# Patient Record
Sex: MALE | Race: WHITE | Employment: UNEMPLOYED | URBAN - METROPOLITAN AREA
[De-identification: names, ages, dates, MRNs, and addresses within clinical notes are randomized per-mention and may not be internally consistent; named-entity substitution may affect disease eponyms.]

---

## 2018-06-06 ENCOUNTER — OFFICE VISIT (OUTPATIENT)
Dept: FAMILY MEDICINE CLINIC | Age: 33
End: 2018-06-06

## 2018-06-06 VITALS
WEIGHT: 214 LBS | HEIGHT: 70 IN | BODY MASS INDEX: 30.64 KG/M2 | RESPIRATION RATE: 15 BRPM | TEMPERATURE: 98.6 F | OXYGEN SATURATION: 98 % | HEART RATE: 79 BPM | DIASTOLIC BLOOD PRESSURE: 70 MMHG | SYSTOLIC BLOOD PRESSURE: 126 MMHG

## 2018-06-06 DIAGNOSIS — H65.02 ACUTE SEROUS OTITIS MEDIA OF LEFT EAR, RECURRENCE NOT SPECIFIED: ICD-10-CM

## 2018-06-06 DIAGNOSIS — F41.9 ANXIETY: ICD-10-CM

## 2018-06-06 DIAGNOSIS — K04.7 ABSCESSED TOOTH: Primary | ICD-10-CM

## 2018-06-06 PROBLEM — F32.A DEPRESSION: Status: ACTIVE | Noted: 2018-06-06

## 2018-06-06 RX ORDER — CLINDAMYCIN HYDROCHLORIDE 300 MG/1
CAPSULE ORAL
COMMUNITY
Start: 2018-06-04 | End: 2018-06-25 | Stop reason: SDUPTHER

## 2018-06-06 RX ORDER — CITALOPRAM 10 MG/1
TABLET ORAL
Refills: 5 | COMMUNITY
Start: 2018-05-16 | End: 2018-09-20 | Stop reason: ALTCHOICE

## 2018-06-06 RX ORDER — NEOMYCIN SULFATE, POLYMYXIN B SULFATE AND HYDROCORTISONE 10; 3.5; 1 MG/ML; MG/ML; [USP'U]/ML
3 SUSPENSION/ DROPS AURICULAR (OTIC) 4 TIMES DAILY
Qty: 10 ML | Refills: 0 | Status: SHIPPED | OUTPATIENT
Start: 2018-06-06 | End: 2018-06-16

## 2018-06-06 NOTE — PATIENT INSTRUCTIONS
Abscessed Tooth: Care Instructions  Your Care Instructions    An abscessed tooth is a tooth that has a pocket of pus in the tissues around it. Pus forms when the body tries to fight an infection caused by bacteria. If the pus cannot drain, it forms an abscess. An abscessed tooth can cause red, swollen gums and throbbing pain, especially when you chew. You may have a bad taste in your mouth and a fever, and your jaw may swell. Damage to the tooth, untreated tooth decay, or gum disease can cause an abscessed tooth. An abscessed tooth needs to be treated by a dental professional right away. If it is not treated, the infection could spread to other parts of your body. Your dentist will give you antibiotics to stop the infection. He or she may make a hole in the tooth or cut open (yina) the abscess inside your mouth so that the infection can drain, which should relieve your pain. You may need to have a root canal treatment, which tries to save your tooth by taking out the infected pulp and replacing it with a healing medicine and/or a filling. If these treatments do not work, your tooth may have to be removed. Follow-up care is a key part of your treatment and safety. Be sure to make and go to all appointments, and call your doctor if you are having problems. It's also a good idea to know your test results and keep a list of the medicines you take. How can you care for yourself at home? · Reduce pain and swelling in your face and jaw by putting ice or a cold pack on the outside of your cheek for 10 to 20 minutes at a time. Put a thin cloth between the ice and your skin. · Take pain medicines exactly as directed. ¨ If the doctor gave you a prescription medicine for pain, take it as prescribed. ¨ If you are not taking a prescription pain medicine, ask your doctor if you can take an over-the-counter medicine. · Take your antibiotics as directed. Do not stop taking them just because you feel better.  You need to take the full course of antibiotics. To prevent tooth abscess  · Brush and floss every day, and have regular dental checkups. · Eat a healthy diet, and avoid sugary foods and drinks. · Do not smoke, use e-cigarettes with nicotine, or use spit tobacco. Tobacco and nicotine slow your ability to heal. Tobacco also increases your risk for gum disease and cancer of the mouth and throat. If you need help quitting, talk to your doctor about stop-smoking programs and medicines. These can increase your chances of quitting for good. When should you call for help? Call 911 anytime you think you may need emergency care. For example, call if:  ? · You have trouble breathing. ?Call your doctor now or seek immediate medical care if:  ? · You have new or worse symptoms of infection, such as:  ¨ Increased pain, swelling, warmth, or redness. ¨ Red streaks leading from the area. ¨ Pus draining from the area. ¨ A fever. ? Watch closely for changes in your health, and be sure to contact your doctor if:  ? · You do not get better as expected. Where can you learn more? Go to http://june-adonis.info/. Enter T604 in the search box to learn more about \"Abscessed Tooth: Care Instructions. \"  Current as of: May 12, 2017  Content Version: 11.4  © 8575-9461 Healthwise, Incorporated. Care instructions adapted under license by [employer] (which disclaims liability or warranty for this information). If you have questions about a medical condition or this instruction, always ask your healthcare professional. [place] [address] any warranty or liability for your use of this information.

## 2018-06-06 NOTE — MR AVS SNAPSHOT
[facility] [address] [facility] [facility] [address] 
[phone] Patient: Shante Bautista MRN: [id number] Visit Information Date & Time Provider Department Dept. Phone Encounter #  
 6/6/2018 11:30 AM Sun Jade [phone] [id number] Follow-up Instructions Return in about 2 months (around 8/6/2018). Your Appointments 6/6/2018 11:30 AM  
New Patient with MD Sun Jade [facility] App[facility] Note: np est care, seizures, bringing id & ins, arriving 30 min early [facility]; 6/4/18 - mom [facility] appt, also tooth ache. md  
 [address] [facility] [address] [place] [address] Road  
[phone]  
  
   
 [facility] [address] [place] [address] Upcoming Health Maintenance Date Due DTaP/Tdap/Td series (1 - Tdap) 9/3/2007 Influenza Age 5 to Adult 8/1/2018 Allergies as of 6/6/2018  Review Complete On: 6/11/2014 By: Francisco Joseph MD  
  
 Severity Noted Reaction Type Reactions Shellfish Derived High 06/06/2018    Anaphylaxis Sulfa (Sulfonamide Antibiotics) High 06/11/2014   Systemic Hives, Rash Ceclor [Cefaclor] Medium 06/11/2014   Systemic Unknown (comments) Erythromycin Medium 06/11/2014   Systemic Unknown (comments) Other Plant, Animal, Environmental Medium 06/11/2014   Systemic Rash, Itching Seasonal allergies to 10 types of trees Current Immunizations  Never Reviewed No immunizations on file. Not reviewed this visit You Were Diagnosed With   
  
 Codes Comments Abscessed tooth    -  Primary ICD-10-CM: K04.7 ICD-9-CM: 522.5 Anxiety     ICD-10-CM: F41.9 ICD-9-CM: 300.00 Vitals BP Pulse Temp Resp Height(growth percentile) Weight(growth percentile) 126/70 79 98.6 °F (37 °C) 15 5' 10\" (1.778 m) 214 lb (97.1 kg) SpO2 BMI Smoking Status 98% 30.71 kg/m2 Never Smoker Vitals History BMI and BSA Data Body Mass Index Body Surface Area 30.71 kg/m 2 2.19 m 2 Your Updated Medication List  
  
   
This list is accurate as of 6/6/18 11:02 AM.  Always use your most recent med list.  
  
  
  
  
 citalopram 10 mg tablet Commonly known as:  CELEXA  
TAKE 1 TABLET BY MOUTH EVERY DAY  
  
 clindamycin 300 mg capsule Commonly known as:  CLEOCIN Follow-up Instructions Return in about 2 months (around 8/6/2018). Patient Instructions Abscessed Tooth: Care Instructions Your Care Instructions An abscessed tooth is a tooth that has a pocket of pus in the tissues around it. Pus forms when the body tries to fight an infection caused by bacteria. If the pus cannot drain, it forms an abscess. An abscessed tooth can cause red, swollen gums and throbbing pain, especially when you chew. You may have a bad taste in your mouth and a fever, and your jaw may swell. Damage to the tooth, untreated tooth decay, or gum disease can cause an abscessed tooth. An abscessed tooth needs to be treated by a dental professional right away. If it is not treated, the infection could spread to other parts of your body. Your dentist will give you antibiotics to stop the infection. He or she may make a hole in the tooth or cut open (yina) the abscess inside your mouth so that the infection can drain, which should relieve your pain. You may need to have a root canal treatment, which tries to save your tooth by taking out the infected pulp and replacing it with a healing medicine and/or a filling. If these treatments do not work, your tooth may have to be removed. Follow-up care is a key part of your treatment and safety. Be sure to make and go to all appointments, and call your doctor if you are having problems. It's also a good idea to know your test results and keep a list of the medicines you take. How can you care for yourself at home? · Reduce pain and swelling in your face and jaw by putting ice or a cold pack on the outside of your cheek for 10 to 20 minutes at a time. Put a thin cloth between the ice and your skin. · Take pain medicines exactly as directed. ¨ If the doctor gave you a prescription medicine for pain, take it as prescribed. ¨ If you are not taking a prescription pain medicine, ask your doctor if you can take an over-the-counter medicine. · Take your antibiotics as directed. Do not stop taking them just because you feel better. You need to take the full course of antibiotics. To prevent tooth abscess · Brush and floss every day, and have regular dental checkups. · Eat a healthy diet, and avoid sugary foods and drinks. · Do not smoke, use e-cigarettes with nicotine, or use spit tobacco. Tobacco and nicotine slow your ability to heal. Tobacco also increases your risk for gum disease and cancer of the mouth and throat. If you need help quitting, talk to your doctor about stop-smoking programs and medicines. These can increase your chances of quitting for good. When should you call for help? Call 911 anytime you think you may need emergency care. For example, call if: 
? · You have trouble breathing. ?Call your doctor now or seek immediate medical care if: 
? · You have new or worse symptoms of infection, such as: 
¨ Increased pain, swelling, warmth, or redness. ¨ Red streaks leading from the area. ¨ Pus draining from the area. ¨ A fever. ? Watch closely for changes in your health, and be sure to contact your doctor if: 
? · You do not get better as expected. Where can you learn more? Go to http://june-adonis.info/. Enter N329 in the search box to learn more about \"Abscessed Tooth: Care Instructions. \" Current as of: May 12, 2017 Content Version: 11.4 © 7821-1853 Healthwise, Incorporated.  Care instructions adapted under license by [address] (which disclaims liability or warranty for this information). If you have questions about a medical condition or this instruction, always ask your healthcare professional. [address] any warranty or liability for your use of this information. Introducing [facility]! Antwan Martin introduces GetGoing patient portal. Now you can access parts of your medical record, email your doctor's office, and request medication refills online. 1. In your internet browser, go to [email or web address] 2. Click on the First Time User? Click Here link in the Sign In box. You will see the New Member Sign Up page. 3. Enter your GetGoing Access Code exactly as it appears below. You will not need to use this code after youve completed the sign-up process. If you do not sign up before the expiration date, you must request a new code. · GetGoing Access Code: [email or web address] Expires: 9/4/2018 11:01 AM 
 
4. Enter the last four digits of your Social Security Number (xxxx) and Date of Birth (mm/dd/yyyy) as indicated and click Submit. You will be taken to the next sign-up page. 5. Create a GetGoing ID. This will be your GetGoing login ID and cannot be changed, so think of one that is secure and easy to remember. 6. Create a GetGoing password. You can change your password at any time. 7. Enter your Password Reset Question and Answer. This can be used at a later time if you forget your password. 8. Enter your e-mail address. You will receive e-mail notification when new information is available in [address] 9. Click Sign Up. You can now view and download portions of your medical record. 10. Click the Download Summary menu link to download a portable copy of your medical information. If you have questions, please visit the Frequently Asked Questions section of the GetGoing website.  Remember, GetGoing is NOT to be used for urgent needs. For medical emergencies, dial 911. Now available from your iPhone and Android! Please provide this summary of care documentation to your next provider. If you have any questions after today's visit, please call [phone].

## 2018-06-06 NOTE — PROGRESS NOTES
Abimael Saxena is a 33 y.o. male  presents for establish care. He has history of seizures. He also has abscessed tooth. He has pain in left ear. Allergies   Allergen Reactions    Shellfish Derived Anaphylaxis    Sulfa (Sulfonamide Antibiotics) Hives and Rash    Ceclor [Cefaclor] Unknown (comments)    Erythromycin Unknown (comments)    Other Plant, Animal, Environmental Rash and Itching     Seasonal allergies to 10 types of trees       Patient Active Problem List   Diagnosis Code    Pollen allergies J30.1    Scoliosis M41.9    Depression F32.9     Past Medical History:   Diagnosis Date    Chronic ear infection     Knee pain     right knee pain     Scoliosis     curvature of spine     Social History     Social History    Marital status: LEGALLY      Spouse name: N/A    Number of children: N/A    Years of education: N/A     Social History Main Topics    Smoking status: Never Smoker    Smokeless tobacco: Never Used    Alcohol use 0.0 - 0.5 oz/week     0 - 1 drink(s) per week    Drug use: No    Sexual activity: Not Asked     Other Topics Concern    None     Social History Narrative     Family History   Problem Relation Age of Onset    Hypertension Mother     Seizures Mother     Seizures Brother     Hypertension Maternal Grandmother     Cancer Maternal Grandfather         Review of Systems   Constitutional: Negative. Negative for chills, diaphoresis and fever. HENT: Positive for ear pain. Negative for ear discharge, hearing loss and tinnitus. Eyes: Negative. Respiratory: Negative. Negative for cough, sputum production and shortness of breath. Cardiovascular: Negative for chest pain, palpitations and leg swelling. Gastrointestinal: Negative for constipation, diarrhea, heartburn and nausea. Musculoskeletal: Negative. Skin: Negative for rash. Neurological: Negative.   Negative for dizziness, tingling, sensory change, speech change, focal weakness, seizures, weakness and headaches. Psychiatric/Behavioral: Negative for hallucinations, memory loss, substance abuse and suicidal ideas. The patient is nervous/anxious. The patient does not have insomnia. Vitals:    06/06/18 1047   BP: 126/70   Pulse: 79   Resp: 15   Temp: 98.6 °F (37 °C)   SpO2: 98%   Weight: 214 lb (97.1 kg)   Height: 5' 10\" (1.778 m)   PainSc:   0 - No pain       Physical Exam   Constitutional: He is oriented to person, place, and time and well-developed, well-nourished, and in no distress. HENT:   Injected tm on left. Eyes: Conjunctivae are normal. Pupils are equal, round, and reactive to light. Neck: Normal range of motion. Neck supple. Cardiovascular: Normal rate, regular rhythm and normal heart sounds. Pulmonary/Chest: Effort normal and breath sounds normal.   Neurological: He is alert and oriented to person, place, and time. Skin: Skin is warm and dry. Psychiatric: Mood, memory, affect and judgment normal.   Nursing note and vitals reviewed. Assessment/Plan      ICD-10-CM ICD-9-CM    1. Abscessed tooth K04.7 522.5    2. Anxiety F41.9 300.00    3. Acute serous otitis media of left ear, recurrence not specified H65.02 381.01 neomycin-polymyxin-hydrocortisone, buffered, (PEDIOTIC) 3.5-10,000-1 mg/mL-unit/mL-% otic suspension     I have discussed the diagnosis with the patient and the intended plan of care as seen in the above orders. The patient has received an after-visit summary and questions were answered concerning future plans. I have discussed medication, side effects, and warnings with the patient in detail. The patient verbalized understanding and is in agreement with the plan of care. The patient will contact the office with any additional concerns. Follow-up Disposition:  Return in about 1 week (around 6/13/2018).   lab results and schedule of future lab studies reviewed with patient    Adriane Vivas MD

## 2018-06-12 ENCOUNTER — OFFICE VISIT (OUTPATIENT)
Dept: FAMILY MEDICINE CLINIC | Age: 33
End: 2018-06-12

## 2018-06-12 ENCOUNTER — HOSPITAL ENCOUNTER (OUTPATIENT)
Dept: LAB | Age: 33
Discharge: HOME | End: 2018-06-12
Payer: COMMERCIAL

## 2018-06-12 VITALS
SYSTOLIC BLOOD PRESSURE: 108 MMHG | RESPIRATION RATE: 14 BRPM | DIASTOLIC BLOOD PRESSURE: 70 MMHG | WEIGHT: 216.4 LBS | TEMPERATURE: 98.2 F | HEIGHT: 70 IN | OXYGEN SATURATION: 97 % | BODY MASS INDEX: 30.98 KG/M2 | HEART RATE: 65 BPM

## 2018-06-12 DIAGNOSIS — F32.89 OTHER DEPRESSION: Primary | ICD-10-CM

## 2018-06-12 DIAGNOSIS — F32.89 OTHER DEPRESSION: ICD-10-CM

## 2018-06-12 LAB
ALBUMIN SERPL-MCNC: 4.1 G/DL (ref 3.4–5)
ALBUMIN/GLOB SERPL: 1.3 {RATIO} (ref 0.8–1.7)
ALP SERPL-CCNC: 105 U/L (ref 45–117)
ALT SERPL-CCNC: 40 U/L (ref 16–61)
ANION GAP SERPL CALC-SCNC: 9 MMOL/L (ref 3–18)
AST SERPL-CCNC: 24 U/L (ref 15–37)
BASOPHILS # BLD: 0.1 K/UL (ref 0–0.06)
BASOPHILS NFR BLD: 1 % (ref 0–2)
BILIRUB SERPL-MCNC: 0.3 MG/DL (ref 0.2–1)
BUN SERPL-MCNC: 14 MG/DL (ref 7–18)
BUN/CREAT SERPL: 14 (ref 12–20)
CALCIUM SERPL-MCNC: 9.2 MG/DL (ref 8.5–10.1)
CHLORIDE SERPL-SCNC: 101 MMOL/L (ref 100–108)
CHOLEST SERPL-MCNC: 218 MG/DL
CO2 SERPL-SCNC: 29 MMOL/L (ref 21–32)
CREAT SERPL-MCNC: 1.01 MG/DL (ref 0.6–1.3)
DIFFERENTIAL METHOD BLD: ABNORMAL
EOSINOPHIL # BLD: 0.2 K/UL (ref 0–0.4)
EOSINOPHIL NFR BLD: 2 % (ref 0–5)
ERYTHROCYTE [DISTWIDTH] IN BLOOD BY AUTOMATED COUNT: 12.6 % (ref 11.6–14.5)
GLOBULIN SER CALC-MCNC: 3.2 G/DL (ref 2–4)
GLUCOSE SERPL-MCNC: 85 MG/DL (ref 74–99)
HCT VFR BLD AUTO: 47.9 % (ref 36–48)
HDLC SERPL-MCNC: 41 MG/DL (ref 40–60)
HDLC SERPL: 5.3 {RATIO} (ref 0–5)
HGB BLD-MCNC: 16.2 G/DL (ref 13–16)
LDLC SERPL CALC-MCNC: 149.2 MG/DL (ref 0–100)
LIPID PROFILE,FLP: ABNORMAL
LYMPHOCYTES # BLD: 2 K/UL (ref 0.9–3.6)
LYMPHOCYTES NFR BLD: 28 % (ref 21–52)
MCH RBC QN AUTO: 31.8 PG (ref 24–34)
MCHC RBC AUTO-ENTMCNC: 33.8 G/DL (ref 31–37)
MCV RBC AUTO: 94.1 FL (ref 74–97)
MONOCYTES # BLD: 0.7 K/UL (ref 0.05–1.2)
MONOCYTES NFR BLD: 10 % (ref 3–10)
NEUTS SEG # BLD: 4.1 K/UL (ref 1.8–8)
NEUTS SEG NFR BLD: 59 % (ref 40–73)
PLATELET # BLD AUTO: 248 K/UL (ref 135–420)
PMV BLD AUTO: 10.7 FL (ref 9.2–11.8)
POTASSIUM SERPL-SCNC: 4.7 MMOL/L (ref 3.5–5.5)
PROT SERPL-MCNC: 7.3 G/DL (ref 6.4–8.2)
RBC # BLD AUTO: 5.09 M/UL (ref 4.7–5.5)
SODIUM SERPL-SCNC: 139 MMOL/L (ref 136–145)
TRIGL SERPL-MCNC: 139 MG/DL (ref ?–150)
TSH SERPL DL<=0.05 MIU/L-ACNC: 3.78 UIU/ML (ref 0.36–3.74)
VLDLC SERPL CALC-MCNC: 27.8 MG/DL
WBC # BLD AUTO: 6.9 K/UL (ref 4.6–13.2)

## 2018-06-12 PROCEDURE — 84443 ASSAY THYROID STIM HORMONE: CPT | Performed by: FAMILY MEDICINE

## 2018-06-12 PROCEDURE — 80053 COMPREHEN METABOLIC PANEL: CPT | Performed by: FAMILY MEDICINE

## 2018-06-12 PROCEDURE — 80061 LIPID PANEL: CPT | Performed by: FAMILY MEDICINE

## 2018-06-12 PROCEDURE — 85025 COMPLETE CBC W/AUTO DIFF WBC: CPT | Performed by: FAMILY MEDICINE

## 2018-06-12 NOTE — PATIENT INSTRUCTIONS
Recovering From Depression: Care Instructions  Your Care Instructions    Taking good care of yourself is important as you recover from depression. In time, your symptoms will fade as your treatment takes hold. Do not give up. Instead, focus your energy on getting better. Your mood will improve. It just takes some time. Focus on things that can help you feel better, such as being with friends and family, eating well, and getting enough rest. But take things slowly. Do not do too much too soon. You will begin to feel better gradually. Follow-up care is a key part of your treatment and safety. Be sure to make and go to all appointments, and call your doctor if you are having problems. It's also a good idea to know your test results and keep a list of the medicines you take. How can you care for yourself at home? Be realistic  · If you have a large task to do, break it up into smaller steps you can handle, and just do what you can. · You may want to put off important decisions until your depression has lifted. If you have plans that will have a major impact on your life, such as marriage, divorce, or a job change, try to wait a bit. Talk it over with friends and loved ones who can help you look at the overall picture first.  · Reaching out to people for help is important. Do not isolate yourself. Let your family and friends help you. Find someone you can trust and confide in, and talk to that person. · Be patient, and be kind to yourself. Remember that depression is not your fault and is not something you can overcome with willpower alone. Treatment is necessary for depression, just like for any other illness. Feeling better takes time, and your mood will improve little by little. Stay active  · Stay busy and get outside. Take a walk, or try some other light exercise. · Talk with your doctor about an exercise program. Exercise can help with mild depression. · Go to a movie or concert.  Take part in a Baptism activity or other social gathering. Go to a [employer] game. · Ask a friend to have dinner with you. Take care of yourself  · Eat a balanced diet with plenty of fresh fruits and vegetables, whole grains, and lean protein. If you have lost your appetite, eat small snacks rather than large meals. · Avoid drinking alcohol or using illegal drugs. Do not take medicines that have not been prescribed for you. They may interfere with medicines you may be taking for depression, or they may make your depression worse. · Take your medicines exactly as they are prescribed. You may start to feel better within 1 to 3 weeks of taking antidepressant medicine. But it can take as many as 6 to 8 weeks to see more improvement. If you have questions or concerns about your medicines, or if you do not notice any improvement by 3 weeks, talk to your doctor. · If you have any side effects from your medicine, tell your doctor. Antidepressants can make you feel tired, dizzy, or nervous. Some people have dry mouth, constipation, headaches, sexual problems, or diarrhea. Many of these side effects are mild and will go away on their own after you have been taking the medicine for a few weeks. Some may last longer. Talk to your doctor if side effects are bothering you too much. You might be able to try a different medicine. · Get enough sleep. If you have problems sleeping:  ¨ Go to bed at the same time every night, and get up at the same time every morning. ¨ Keep your bedroom dark and quiet. ¨ Do not exercise after 5:00 p.m. ¨ Avoid drinks with caffeine after 5:00 p.m. · Avoid sleeping pills unless they are prescribed by the doctor treating your depression. Sleeping pills may make you groggy during the day, and they may interact with other medicine you are taking. · If you have any other illnesses, such as diabetes, heart disease, or high blood pressure, make sure to continue with your treatment.  Tell your doctor about all of the medicines you take, including those with or without a prescription. · Keep the numbers for these national suicide hotlines: 4-411-637-TALK (1-149.739.6633) and 1-851-CNCTCYG (6-766.569.6072). If you or someone you know talks about suicide or feeling hopeless, get help right away. When should you call for help? Call 911 anytime you think you may need emergency care. For example, call if:  ? · You feel like hurting yourself or someone else. ? · Someone you know has depression and is about to attempt or is attempting suicide. ?Call your doctor now or seek immediate medical care if:  ? · You hear voices. ? · Someone you know has depression and:  ¨ Starts to give away his or her possessions. ¨ Uses illegal drugs or drinks alcohol heavily. ¨ Talks or writes about death, including writing suicide notes or talking about guns, knives, or pills. ¨ Starts to spend a lot of time alone. ¨ Acts very aggressively or suddenly appears calm. ? Watch closely for changes in your health, and be sure to contact your doctor if:  ? · You do not get better as expected. Where can you learn more? Go to http://june-adonis.info/. Enter W164 in the search box to learn more about \"Recovering From Depression: Care Instructions. \"  Current as of: May 12, 2017  Content Version: 11.4  © 0117-9564 Healthwise, [employer]. Care instructions adapted under license by [employer] (which disclaims liability or warranty for this information). If you have questions about a medical condition or this instruction, always ask your healthcare professional. [address] any warranty or liability for your use of this information.

## 2018-06-12 NOTE — PROGRESS NOTES
Patient here for f/u on his abscessed tooth and ear pain. Ear has resolved patient states he still gets a throbbing pain in his tooth every so often. His mom is requesting that he has blood work done today. 1. Have you been to the ER, urgent care clinic since your last visit? Hospitalized since your last visit? No  2. Have you seen or consulted any other health care providers outside of the [address] [place] [address] since your last visit? Include any pap smears or colon screening. No  Health Maintenance reviewed - Patient had Tdap done in [facility] will request report.

## 2018-06-12 NOTE — MR AVS SNAPSHOT
[facility] [address] [facility] 
[phone] Patient: Daryle Peru MRN: [id number] SXE:[birth year] Visit Information Date & Time Provider Department Dept. Phone Encounter #  
 6/12/2018  8:30 AM Natalee RiddleSun 94 [phone] [id number] Follow-up Instructions Return in about 3 months (around 9/12/2018). Upcoming Health Maintenance Date Due DTaP/Tdap/Td series (1 - Tdap) 9/3/2007 Influenza Age 5 to Adult 8/1/2018 Allergies as of 6/12/2018  Review Complete On: 6/12/2018 By: Natalee Riddle MD  
  
 Severity Noted Reaction Type Reactions Shellfish Derived High 06/06/2018    Anaphylaxis Sulfa (Sulfonamide Antibiotics) High 06/11/2014   Systemic Hives, Rash Ceclor [Cefaclor] Medium 06/11/2014   Systemic Unknown (comments) Erythromycin Medium 06/11/2014   Systemic Unknown (comments) Other Plant, Animal, Environmental Medium 06/11/2014   Systemic Rash, Itching Seasonal allergies to 10 types of trees Current Immunizations  Never Reviewed No immunizations on file. Not reviewed this visit You Were Diagnosed With   
  
 Codes Comments Other depression    -  Primary ICD-10-CM: F32.89 ICD-9-CM: 993 Vitals BP Pulse Temp Resp Height(growth percentile) Weight(growth percentile) 108/70 65 98.2 °F (36.8 °C) (Oral) 14 5' 10\" (1.778 m) 216 lb 6.4 oz (98.2 kg) SpO2 BMI Smoking Status 97% 31.05 kg/m2 Never Smoker Vitals History BMI and BSA Data Body Mass Index Body Surface Area 31.05 kg/m 2 2.2 m 2 Preferred Pharmacy Pharmacy Name Phone [id number] [address] Your Updated Medication List  
  
   
This list is accurate as of 6/12/18  9:01 AM.  Always use your most recent med list.  
  
  
  
  
 citalopram 10 mg tablet Commonly known as:  CELEXA  
TAKE 1 TABLET BY MOUTH EVERY DAY  
  
 clindamycin 300 mg capsule Commonly known as:  CLEOCIN  
  
 neomycin-polymyxin-hydrocortisone (buffered) 3.5-10,000-1 mg/mL-unit/mL-% otic suspension Commonly known as:  Roxana Foley Administer 3 Drops in left ear four (4) times daily for 10 days. Follow-up Instructions Return in about 3 months (around 9/12/2018). To-Do List   
 06/12/2018 Lab:  CBC WITH AUTOMATED DIFF   
  
 06/12/2018 Lab:  LIPID PANEL   
  
 06/12/2018 Lab:  METABOLIC PANEL, COMPREHENSIVE   
  
 06/12/2018 Lab:  TSH 3RD GENERATION Patient Instructions Recovering From Depression: Care Instructions Your Care Instructions Taking good care of yourself is important as you recover from depression. In time, your symptoms will fade as your treatment takes hold. Do not give up. Instead, focus your energy on getting better. Your mood will improve. It just takes some time. Focus on things that can help you feel better, such as being with friends and family, eating well, and getting enough rest. But take things slowly. Do not do too much too soon. You will begin to feel better gradually. Follow-up care is a key part of your treatment and safety. Be sure to make and go to all appointments, and call your doctor if you are having problems. It's also a good idea to know your test results and keep a list of the medicines you take. How can you care for yourself at home? Be realistic · If you have a large task to do, break it up into smaller steps you can handle, and just do what you can. · You may want to put off important decisions until your depression has lifted. If you have plans that will have a major impact on your life, such as marriage, divorce, or a job change, try to wait a bit.  Talk it over with friends and loved ones who can help you look at the overall picture first. 
 · Reaching out to people for help is important. Do not isolate yourself. Let your family and friends help you. Find someone you can trust and confide in, and talk to that person. · Be patient, and be kind to yourself. Remember that depression is not your fault and is not something you can overcome with willpower alone. Treatment is necessary for depression, just like for any other illness. Feeling better takes time, and your mood will improve little by little. Stay active · Stay busy and get outside. Take a walk, or try some other light exercise. · Talk with your doctor about an exercise program. Exercise can help with mild depression. · Go to a movie or concert. Take part in a Alevism activity or other social gathering. Go to a ball game. · Ask a friend to have dinner with you. Take care of yourself · Eat a balanced diet with plenty of fresh fruits and vegetables, whole grains, and lean protein. If you have lost your appetite, eat small snacks rather than large meals. · Avoid drinking alcohol or using illegal drugs. Do not take medicines that have not been prescribed for you. They may interfere with medicines you may be taking for depression, or they may make your depression worse. · Take your medicines exactly as they are prescribed. You may start to feel better within 1 to 3 weeks of taking antidepressant medicine. But it can take as many as 6 to 8 weeks to see more improvement. If you have questions or concerns about your medicines, or if you do not notice any improvement by 3 weeks, talk to your doctor. · If you have any side effects from your medicine, tell your doctor. Antidepressants can make you feel tired, dizzy, or nervous. Some people have dry mouth, constipation, headaches, sexual problems, or diarrhea. Many of these side effects are mild and will go away on their own after you have been taking the medicine for a few weeks. Some may last longer. Talk to your doctor if side effects are bothering you too much. You might be able to try a different medicine. · Get enough sleep. If you have problems sleeping: ¨ Go to bed at the same time every night, and get up at the same time every morning. ¨ Keep your bedroom dark and quiet. ¨ Do not exercise after 5:00 p.m. ¨ Avoid drinks with caffeine after 5:00 p.m. · Avoid sleeping pills unless they are prescribed by the doctor treating your depression. Sleeping pills may make you groggy during the day, and they may interact with other medicine you are taking. · If you have any other illnesses, such as diabetes, heart disease, or high blood pressure, make sure to continue with your treatment. Tell your doctor about all of the medicines you take, including those with or without a prescription. · Keep the numbers for these national suicide hotlines: 8-362-604-TALK (7-194.160.1753) and 3-071-CALRPEQ (9-415.419.3980). If you or someone you know talks about suicide or feeling hopeless, get help right away. When should you call for help? Call 911 anytime you think you may need emergency care. For example, call if: 
? · You feel like hurting yourself or someone else. ? · Someone you know has depression and is about to attempt or is attempting suicide. ?Call your doctor now or seek immediate medical care if: 
? · You hear voices. ? · Someone you know has depression and: 
¨ Starts to give away his or her possessions. ¨ Uses illegal drugs or drinks alcohol heavily. ¨ Talks or writes about death, including writing suicide notes or talking about guns, knives, or pills. ¨ Starts to spend a lot of time alone. ¨ Acts very aggressively or suddenly appears calm. ? Watch closely for changes in your health, and be sure to contact your doctor if: 
? · You do not get better as expected. Where can you learn more? Go to http://june-adonis.info/. Enter V031 in the search box to learn more about \"Recovering From Depression: Care Instructions. \" Current as of: May 12, 2017 Content Version: 11.4 © 9780-5461 Healthwise, [employer]. Care instructions adapted under license by [employer] (which disclaims liability or warranty for this information). If you have questions about a medical condition or this instruction, always ask your healthcare professional. [address] any warranty or liability for your use of this information. Introducing [facility]! Yomi Calero introduces MollyWatr patient portal. Now you can access parts of your medical record, email your doctor's office, and request medication refills online. 1. In your internet browser, go to [email or web address] 2. Click on the First Time User? Click Here link in the Sign In box. You will see the New Member Sign Up page. 3. Enter your MollyWatr Access Code exactly as it appears below. You will not need to use this code after youve completed the sign-up process. If you do not sign up before the expiration date, you must request a new code. · MollyWatr Access Code: [email or web address] Expires: 9/4/2018 11:01 AM 
 
4. Enter the last four digits of your Social Security Number (xxxx) and Date of Birth (mm/dd/yyyy) as indicated and click Submit. You will be taken to the next sign-up page. 5. Create a MollyWatr ID. This will be your MollyWatr login ID and cannot be changed, so think of one that is secure and easy to remember. 6. Create a MollyWatr password. You can change your password at any time. 7. Enter your Password Reset Question and Answer. This can be used at a later time if you forget your password. 8. Enter your e-mail address. You will receive e-mail notification when new information is available in [address] 9. Click Sign Up. You can now view and download portions of your medical record. 10. Click the Download Summary menu link to download a portable copy of your medical information. If you have questions, please visit the Frequently Asked Questions section of the [employer] website. Remember, [employer] is NOT to be used for urgent needs. For medical emergencies, dial 911. Now available from your iPhone and Android! Please provide this summary of care documentation to your next provider. If you have any questions after today's visit, please call [phone].

## 2018-06-12 NOTE — PROGRESS NOTES
Anton Maya is a 33 y.o. male  presents for follow up. He states that he is feeling better today. Very mild mouth pain. Allergies   Allergen Reactions    Shellfish Derived Anaphylaxis    Sulfa (Sulfonamide Antibiotics) Hives and Rash    Ceclor [Cefaclor] Unknown (comments)    Erythromycin Unknown (comments)    Other Plant, Animal, Environmental Rash and Itching     Seasonal allergies to 10 types of trees     Outpatient Prescriptions Marked as Taking for the 6/12/18 encounter (Office Visit) with Kim Abbott MD   Medication Sig Dispense Refill    citalopram (CELEXA) 10 mg tablet TAKE 1 TABLET BY MOUTH EVERY DAY  5    clindamycin (CLEOCIN) 300 mg capsule       neomycin-polymyxin-hydrocortisone, buffered, (PEDIOTIC) 3.5-10,000-1 mg/mL-unit/mL-% otic suspension Administer 3 Drops in left ear four (4) times daily for 10 days. 10 mL 0     Patient Active Problem List   Diagnosis Code    Pollen allergies J30.1    Scoliosis M41.9    Depression F32.9     Past Medical History:   Diagnosis Date    Chronic ear infection     Knee pain     right knee pain     Scoliosis     curvature of spine     Social History     Social History    Marital status: LEGALLY      Spouse name: N/A    Number of children: N/A    Years of education: N/A     Social History Main Topics    Smoking status: Never Smoker    Smokeless tobacco: Never Used    Alcohol use 0.0 - 0.5 oz/week     0 - 1 drink(s) per week    Drug use: No    Sexual activity: Not Asked     Other Topics Concern    None     Social History Narrative     Family History   Problem Relation Age of Onset    Hypertension Mother     Seizures Mother     Seizures Brother     Hypertension Maternal Grandmother     Cancer Maternal Grandfather         Review of Systems   Constitutional: Negative for chills and fever. Cardiovascular: Negative for chest pain and palpitations. Gastrointestinal: Negative for diarrhea, nausea and vomiting. Vitals:    06/12/18 0838   BP: 108/70   Pulse: 65   Resp: 14   Temp: 98.2 °F (36.8 °C)   TempSrc: Oral   SpO2: 97%   Weight: 216 lb 6.4 oz (98.2 kg)   Height: 5' 10\" (1.778 m)   PainSc:   0 - No pain       Physical Exam   Constitutional: He is oriented to person, place, and time and well-developed, well-nourished, and in no distress. Cardiovascular: Normal rate, regular rhythm and normal heart sounds. Pulmonary/Chest: Effort normal and breath sounds normal.   Neurological: He is alert and oriented to person, place, and time. Psychiatric: Memory, affect and judgment normal.   Nursing note and vitals reviewed. Assessment/Plan      ICD-10-CM ICD-9-CM    1. Other depression F32.89 311 CBC WITH AUTOMATED DIFF      METABOLIC PANEL, COMPREHENSIVE      LIPID PANEL      TSH 3RD GENERATION     I have discussed the diagnosis with the patient and the intended plan of care as seen in the above orders. The patient has received an after-visit summary and questions were answered concerning future plans. I have discussed medication, side effects, and warnings with the patient in detail. The patient verbalized understanding and is in agreement with the plan of care. The patient will contact the office with any additional concerns. Follow-up Disposition:  Return in about 3 months (around 9/12/2018).   lab results and schedule of future lab studies reviewed with patient    Adolfo Madera MD

## 2018-06-25 ENCOUNTER — TELEPHONE (OUTPATIENT)
Dept: FAMILY MEDICINE CLINIC | Age: 33
End: 2018-06-25

## 2018-06-25 RX ORDER — CLINDAMYCIN HYDROCHLORIDE 300 MG/1
300 CAPSULE ORAL 4 TIMES DAILY
Qty: 40 CAP | Refills: 0 | Status: SHIPPED | OUTPATIENT
Start: 2018-06-25 | End: 2018-08-03 | Stop reason: ALTCHOICE

## 2018-06-25 NOTE — TELEPHONE ENCOUNTER
Result Notes   Notes Recorded by Nigel Law LPN on 2/03/8788 at 0:55 AM  Will send letter. Subscriber not in service.  ------    Notes Recorded by Nigel Law LPN on 9/09/6908 at 6:14 PM  Subscriber not in service.  ------    Notes Recorded by Brian Grover MD on 6/14/2018 at 1:42 PM  Labs were good except for lipids and thyroid. Will repeat tsh and t4 in one month. Pt has been given results. Pt expressed clear understanding and had no further questions. Pt has been scheduled for f/u thyroid appt.

## 2018-06-25 NOTE — TELEPHONE ENCOUNTER
Pt is requesting refill on cleocin 300 mg. He was taking it QID. He will need enough to last him until his oral surgery on 7/6.

## 2018-06-25 NOTE — TELEPHONE ENCOUNTER
Pt returning [facility] call regarding Dahiana Bass be reach at [phone]

## 2018-08-03 ENCOUNTER — HOSPITAL ENCOUNTER (OUTPATIENT)
Dept: LAB | Age: 33
Discharge: HOME | End: 2018-08-03
Payer: COMMERCIAL

## 2018-08-03 ENCOUNTER — OFFICE VISIT (OUTPATIENT)
Dept: FAMILY MEDICINE CLINIC | Age: 33
End: 2018-08-03

## 2018-08-03 VITALS
HEIGHT: 70 IN | SYSTOLIC BLOOD PRESSURE: 110 MMHG | WEIGHT: 210.4 LBS | DIASTOLIC BLOOD PRESSURE: 78 MMHG | HEART RATE: 53 BPM | OXYGEN SATURATION: 98 % | BODY MASS INDEX: 30.12 KG/M2 | RESPIRATION RATE: 12 BRPM | TEMPERATURE: 98 F

## 2018-08-03 DIAGNOSIS — E78.49 OTHER HYPERLIPIDEMIA: ICD-10-CM

## 2018-08-03 DIAGNOSIS — R79.89 ABNORMAL THYROID BLOOD TEST: ICD-10-CM

## 2018-08-03 DIAGNOSIS — R79.89 ABNORMAL THYROID BLOOD TEST: Primary | ICD-10-CM

## 2018-08-03 DIAGNOSIS — Z23 NEED FOR TDAP VACCINATION: ICD-10-CM

## 2018-08-03 LAB
T4 FREE SERPL-MCNC: 0.9 NG/DL (ref 0.7–1.5)
TSH SERPL DL<=0.05 MIU/L-ACNC: 3.3 UIU/ML (ref 0.36–3.74)

## 2018-08-03 PROCEDURE — 84439 ASSAY OF FREE THYROXINE: CPT | Performed by: FAMILY MEDICINE

## 2018-08-03 NOTE — PROGRESS NOTES
Patient here for f/u on his elevated thyroid level. 1. Have you been to the ER, urgent care clinic since your last visit? Hospitalized since your last visit? No  2. Have you seen or consulted any other health care providers outside of the [address] [place] [address] since your last visit? Include any pap smears or colon screening. No    Medication reconciliation has been completed with patient. Care team discussed/updated as well as pharmacy. Care everywhere has been ran. Health Maintenance reviewed - Flu vaccines are not available Tdap has been pended.

## 2018-08-03 NOTE — PATIENT INSTRUCTIONS
High Cholesterol in Teens: Care Instructions  Your Care Instructions    Cholesterol is a type of fat in your blood. It is needed for many body functions, such as making new cells. Cholesterol is made by your body. It also comes from food you eat. High cholesterol means you have too much of the fat in your blood. If you have high cholesterol, this fat can build up inside your blood vessel walls. Over time, this raises your risk of having heart disease, a heart attack, or a stroke. You can improve your cholesterol levels and lower your risk of heart disease with healthy habits. But for some people, cholesterol problems run in the family. If changes in diet and exercise don't improve your cholesterol levels, you and your doctor can decide if medicine is right for you. Follow-up care is a key part of your treatment and safety. Be sure to make and go to all appointments, and call your doctor if you are having problems. It's also a good idea to know your test results and keep a list of the medicines you take. How can you care for yourself at home? · Eat a variety of foods every day. Good choices include fruits, vegetables, whole grains (like oatmeal), dried beans and peas, and nuts and seeds. Other good choices are soy products (like tofu) and fat-free or low-fat dairy products. · Use olive and canola oils instead of butter, margarine, or hydrogenated or partially hydrogenated oils. (Canola oil margarine without trans fat is fine.)  · Replace red meat with fish, poultry, and soy protein (like tofu). · Limit processed and packaged foods like chips, crackers, and cookies. · Bake, broil, or steam foods instead of frying them. · Be physically active. Get plenty of exercise every day. Go for a walk or jog, ride your bike, or play sports with friends. · Stay at a healthy weight or lose weight by making the changes in eating and physical activity listed above.  Losing just a small amount of weight, even 5 to 10 pounds, can reduce your risk for having a heart attack or stroke. · Do not smoke. Smoking can increase the chance you will have a heart attack. If you need help quitting, talk to your doctor about stop-smoking programs and medicines. These can increase your chances of quitting for good. · If you take medicine for high cholesterol, be sure to take it every day. When should you call for help? Call your doctor now or seek immediate medical care if:    · You have any problems.     · You notice body aches or muscle pain.    Watch closely for changes in your health, and be sure to contact your doctor if:    · You want help in making diet and exercise changes.     · You are worried about your cholesterol level.     · You have family members with very high cholesterol levels. Where can you learn more? Go to http://june-adonis.info/. Enter C030 in the search box to learn more about \"High Cholesterol in Teens: Care Instructions. \"  Current as of: December 6, 2017  Content Version: 11.7  © 8903-2727 [employer]. Care instructions adapted under license by [employer] (which disclaims liability or warranty for this information). If you have questions about a medical condition or this instruction, always ask your healthcare professional. [address] any warranty or liability for your use of this information.

## 2018-08-03 NOTE — PROGRESS NOTES
Tiff Rodriguez is a 33 y.o. male  presents for follow up of thyroid. No more seizures. No chest pains or SOB. Allergies   Allergen Reactions    Shellfish Derived Anaphylaxis    Sulfa (Sulfonamide Antibiotics) Hives and Rash    Ceclor [Cefaclor] Unknown (comments)    Erythromycin Unknown (comments)    Other Plant, Animal, Environmental Rash and Itching     Seasonal allergies to 10 types of trees     Outpatient Prescriptions Marked as Taking for the 8/3/18 encounter (Office Visit) with Bradley Lora MD   Medication Sig Dispense Refill    citalopram (CELEXA) 10 mg tablet TAKE 1 TABLET BY MOUTH EVERY DAY  5     Patient Active Problem List   Diagnosis Code    Pollen allergies J30.1    Scoliosis M41.9    Depression F32.9     Past Medical History:   Diagnosis Date    Chronic ear infection     Knee pain     right knee pain     Scoliosis     curvature of spine     Social History     Social History    Marital status: LEGALLY      Spouse name: N/A    Number of children: N/A    Years of education: N/A     Social History Main Topics    Smoking status: Never Smoker    Smokeless tobacco: Never Used    Alcohol use 0.0 - 0.5 oz/week     0 - 1 drink(s) per week    Drug use: No    Sexual activity: Not Asked     Other Topics Concern    None     Social History Narrative     Family History   Problem Relation Age of Onset    Hypertension Mother     Seizures Mother     Seizures Brother     Hypertension Maternal Grandmother     Cancer Maternal Grandfather         Review of Systems   Constitutional: Negative. Negative for chills, diaphoresis and fever. Eyes: Negative. Respiratory: Negative. Negative for cough, sputum production and shortness of breath. Cardiovascular: Negative for chest pain, palpitations and leg swelling. Gastrointestinal: Negative for constipation, diarrhea, heartburn and nausea. Musculoskeletal: Negative. Skin: Negative for rash. Neurological: Negative. Negative for dizziness, tingling, sensory change, speech change, focal weakness, weakness and headaches. Vitals:    08/03/18 0855   BP: 110/78   Pulse: (!) 53   Resp: 12   Temp: 98 °F (36.7 °C)   TempSrc: Oral   SpO2: 98%   Weight: 210 lb 6.4 oz (95.4 kg)   Height: 5' 10\" (1.778 m)   PainSc:   0 - No pain       Physical Exam   Constitutional: He is well-developed, well-nourished, and in no distress. HENT:   Right Ear: External ear normal.   Left Ear: External ear normal.   Nose: Nose normal.   Mouth/Throat: Oropharynx is clear and moist.   Eyes: Conjunctivae are normal. Pupils are equal, round, and reactive to light. Neck: Normal range of motion. Neck supple. Cardiovascular: Normal rate, regular rhythm and normal heart sounds. Pulmonary/Chest: Effort normal and breath sounds normal.   Abdominal: Soft. Bowel sounds are normal.   Musculoskeletal: Normal range of motion. Neurological: He is alert. Gait normal.   Skin: Skin is warm and dry. Psychiatric: Mood, memory, affect and judgment normal.   Nursing note and vitals reviewed. Assessment/Plan      ICD-10-CM ICD-9-CM    1. Abnormal thyroid blood test R94.6 794.5 TSH AND FREE T4   2. Need for Tdap vaccination Z23 V06.1 TETANUS, DIPHTHERIA TOXOIDS AND ACELLULAR PERTUSSIS VACCINE (TDAP), IN INDIVIDS. >=7, IM   3. Other hyperlipidemia E78.4 272.4      I have discussed the diagnosis with the patient and the intended plan of care as seen in the above orders. The patient has received an after-visit summary and questions were answered concerning future plans. I have discussed medication, side effects, and warnings with the patient in detail. The patient verbalized understanding and is in agreement with the plan of care. The patient will contact the office with any additional concerns. Follow-up Disposition:  Return in about 3 months (around 11/3/2018).   lab results and schedule of future lab studies reviewed with patient    Erin Epperson MD

## 2018-08-03 NOTE — MR AVS SNAPSHOT
[facility] [address] [place] [address] Road 
[phone] Patient: Ashly Henry MRN: [id number] WEO:9[birth year]2/[birth year]98 Visit Information Date & Time Provider Department Dept. Phone Encounter #  
 8/3/2018  9:00 475 [facility], [address] [place] [address] [id number] Follow-up Instructions Return in about 3 months (around 11/3/2018). Your Appointments 9/13/2018  3:30 PM  
FOLLOW UP EXAM with Darcy Farley MD  
[address]) Appt Note: 3 month f/u; r/s'd  
 [address] [facility] [address] [place] [address] Road  
[phone]  
  
   
 [address] Upcoming Health Maintenance Date Due DTaP/Tdap/Td series (1 - Tdap) 9/3/2007 Influenza Age 5 to Adult 8/1/2018 Allergies as of 8/3/2018  Review Complete On: 8/3/2018 By: Darcy Farley MD  
  
 Severity Noted Reaction Type Reactions Shellfish Derived High 06/06/2018    Anaphylaxis Sulfa (Sulfonamide Antibiotics) High 06/11/2014   Systemic Hives, Rash Ceclor [Cefaclor] Medium 06/11/2014   Systemic Unknown (comments) Erythromycin Medium 06/11/2014   Systemic Unknown (comments) Other Plant, Animal, Environmental Medium 06/11/2014   Systemic Rash, Itching Seasonal allergies to 10 types of trees Current Immunizations  Never Reviewed Name Date Tdap  Incomplete Not reviewed this visit You Were Diagnosed With   
  
 Codes Comments Abnormal thyroid blood test    -  Primary ICD-10-CM: R94.6 ICD-9-CM: 794.5 Need for Tdap vaccination     ICD-10-CM: K63 ICD-9-CM: V06.1 Other hyperlipidemia     ICD-10-CM: E78.4 ICD-9-CM: 272.4 Vitals BP Pulse Temp Resp Height(growth percentile) Weight(growth percentile) 110/78 (!) 53 98 °F (36.7 °C) (Oral) 12 5' 10\" (1.778 m) 210 lb 6.4 oz (95.4 kg) SpO2 BMI Smoking Status 98% 30.19 kg/m2 Never Smoker Vitals History BMI and BSA Data Body Mass Index Body Surface Area  
 30.19 kg/m 2 2.17 m 2 Preferred Pharmacy Pharmacy Name Phone [id number] Frank Mohan, [address] [place] [address] Your Updated Medication List  
  
   
This list is accurate as of 8/3/18  9:18 AM.  Always use your most recent med list.  
  
  
  
  
 citalopram 10 mg tablet Commonly known as:  CELEXA  
TAKE 1 TABLET BY MOUTH EVERY DAY We Performed the Following TETANUS, DIPHTHERIA TOXOIDS AND ACELLULAR PERTUSSIS VACCINE (TDAP), IN INDIVIDS. >=7, IM [id number] CPT(R)] Follow-up Instructions Return in about 3 months (around 11/3/2018). To-Do List   
 08/03/2018 Lab:  TSH AND FREE T4 Patient Instructions High Cholesterol in Teens: Care Instructions Your Care Instructions Cholesterol is a type of fat in your blood. It is needed for many body functions, such as making new cells. Cholesterol is made by your body. It also comes from food you eat. High cholesterol means you have too much of the fat in your blood. If you have high cholesterol, this fat can build up inside your blood vessel walls. Over time, this raises your risk of having heart disease, a heart attack, or a stroke. You can improve your cholesterol levels and lower your risk of heart disease with healthy habits. But for some people, cholesterol problems run in the family. If changes in diet and exercise don't improve your cholesterol levels, you and your doctor can decide if medicine is right for you. Follow-up care is a key part of your treatment and safety. Be sure to make and go to all appointments, and call your doctor if you are having problems. It's also a good idea to know your test results and keep a list of the medicines you take. How can you care for yourself at home? · Eat a variety of foods every day. Good choices include fruits, vegetables, whole grains (like oatmeal), dried beans and peas, and nuts and seeds. Other good choices are soy products (like tofu) and fat-free or low-fat dairy products. · Use olive and canola oils instead of butter, margarine, or hydrogenated or partially hydrogenated oils. (Canola oil margarine without trans fat is fine.) · Replace red meat with fish, poultry, and soy protein (like tofu). · Limit processed and packaged foods like chips, crackers, and cookies. · Bake, broil, or steam foods instead of frying them. · Be physically active. Get plenty of exercise every day. Go for a walk or jog, ride your bike, or play sports with friends. · Stay at a healthy weight or lose weight by making the changes in eating and physical activity listed above. Losing just a small amount of weight, even 5 to 10 pounds, can reduce your risk for having a heart attack or stroke. · Do not smoke. Smoking can increase the chance you will have a heart attack. If you need help quitting, talk to your doctor about stop-smoking programs and medicines. These can increase your chances of quitting for good. · If you take medicine for high cholesterol, be sure to take it every day. When should you call for help? Call your doctor now or seek immediate medical care if: 
  · You have any problems.  
  · You notice body aches or muscle pain.  
 Watch closely for changes in your health, and be sure to contact your doctor if: 
  · You want help in making diet and exercise changes.  
  · You are worried about your cholesterol level.  
  · You have family members with very high cholesterol levels. Where can you learn more? Go to http://june-adonis.info/. Enter C030 in the search box to learn more about \"High Cholesterol in Teens: Care Instructions. \" Current as of: December 6, 2017 Content Version: 11.7 © 3922-1213 Healthwise, Incorporated. Care instructions adapted under license by Lang Ma (which disclaims liability or warranty for this information). If you have questions about a medical condition or this instruction, always ask your healthcare professional. [address] any warranty or liability for your use of this information. Introducing [facility]! [address] introduces 3rdKind patient portal. Now you can access parts of your medical record, email your doctor's office, and request medication refills online. 1. In your internet browser, go to [email or web address] 2. Click on the First Time User? Click Here link in the Sign In box. You will see the New Member Sign Up page. 3. Enter your 3rdKind Access Code exactly as it appears below. You will not need to use this code after youve completed the sign-up process. If you do not sign up before the expiration date, you must request a new code. · 3rdKind Access Code: [email or web address] Expires: 9/4/2018 11:01 AM 
 
4. Enter the last four digits of your Social Security Number (xxxx) and Date of Birth (mm/dd/yyyy) as indicated and click Submit. You will be taken to the next sign-up page. 5. Create a 3rdKind ID. This will be your 3rdKind login ID and cannot be changed, so think of one that is secure and easy to remember. 6. Create a 3rdKind password. You can change your password at any time. 7. Enter your Password Reset Question and Answer. This can be used at a later time if you forget your password. 8. Enter your e-mail address. You will receive e-mail notification when new information is available in [address] 9. Click Sign Up. You can now view and download portions of your medical record. 10. Click the Download Summary menu link to download a portable copy of your medical information.  
 
If you have questions, please visit the Frequently Asked Questions section of the [employer]. Remember, Via optronicshart is NOT to be used for urgent needs. For medical emergencies, dial 911. Now available from your iPhone and Android! Please provide this summary of care documentation to your next provider. Your primary care clinician is listed as [address] [facility]. If you have any questions after today's visit, please call [phone].

## 2018-09-20 ENCOUNTER — OFFICE VISIT (OUTPATIENT)
Dept: FAMILY MEDICINE CLINIC | Age: 33
End: 2018-09-20

## 2018-09-20 VITALS
TEMPERATURE: 98.4 F | SYSTOLIC BLOOD PRESSURE: 108 MMHG | OXYGEN SATURATION: 98 % | HEART RATE: 57 BPM | RESPIRATION RATE: 12 BRPM | DIASTOLIC BLOOD PRESSURE: 78 MMHG | HEIGHT: 70 IN | BODY MASS INDEX: 31.15 KG/M2 | WEIGHT: 217.6 LBS

## 2018-09-20 DIAGNOSIS — J30.1 NON-SEASONAL ALLERGIC RHINITIS DUE TO POLLEN: ICD-10-CM

## 2018-09-20 DIAGNOSIS — H54.7 DECREASED VISION: ICD-10-CM

## 2018-09-20 DIAGNOSIS — F32.89 OTHER DEPRESSION: Primary | ICD-10-CM

## 2018-09-20 RX ORDER — ESCITALOPRAM OXALATE 10 MG/1
10 TABLET ORAL DAILY
Qty: 30 TAB | Refills: 2 | Status: SHIPPED | OUTPATIENT
Start: 2018-09-20 | End: 2019-02-06 | Stop reason: SDUPTHER

## 2018-09-20 RX ORDER — ESCITALOPRAM OXALATE 10 MG/1
TABLET ORAL
COMMUNITY
Start: 2018-09-19 | End: 2018-09-20 | Stop reason: SDUPTHER

## 2018-09-20 NOTE — MR AVS SNAPSHOT
[facility] [address] [place] [address] Road 
[phone] Patient: Doris De Leon MRN: [id number] GHF:7[birth year] Visit Information Date & Time Provider Department Dept. Phone Encounter #  
 9/20/2018  3:15 PM Ina Koo MD [facility] [phone] [id number] Follow-up Instructions Return in about 3 months (around 12/20/2018). Your Appointments 9/20/2018  3:15 PM  
FOLLOW UP EXAM with Ina Koo MD  
[facility] Appt Note: 3 month f/u; r/s'd; 9/11/18 2:51pm pts mom r/s'd appointment due to the weather, [address] [employer] [address] [place] [address]  
[phone]  
  
   
 [facility] [address] [place] [address] Upcoming Health Maintenance Date Due Influenza Age 5 to Adult 9/30/2019* DTaP/Tdap/Td series (2 - Td) 8/3/2028 *Topic was postponed. The date shown is not the original due date. Allergies as of 9/20/2018  Review Complete On: 9/20/2018 By: Ina Koo MD  
  
 Severity Noted Reaction Type Reactions Shellfish Derived High 06/06/2018    Anaphylaxis Sulfa (Sulfonamide Antibiotics) High 06/11/2014   Systemic Hives, Rash Ceclor [Cefaclor] Medium 06/11/2014   Systemic Unknown (comments) Erythromycin Medium 06/11/2014   Systemic Unknown (comments) Other Plant, Animal, Environmental Medium 06/11/2014   Systemic Rash, Itching Seasonal allergies to 10 types of trees Current Immunizations  Never Reviewed Name Date Tdap 8/3/2018 Not reviewed this visit You Were Diagnosed With   
  
 Codes Comments Other depression    -  Primary ICD-10-CM: F32.89 ICD-9-CM: 767 Decreased vision     ICD-10-CM: H54.7 ICD-9-CM: 369.9 Non-seasonal allergic rhinitis due to pollen     ICD-10-CM: J30.1 ICD-9-CM: 477.0 Vitals BP Pulse Temp Resp Height(growth percentile) Weight(growth percentile) 108/78 (!) 57 98.4 °F (36.9 °C) (Oral) 12 5' 10\" (1.778 m) 217 lb 9.6 oz (98.7 kg) SpO2 BMI Smoking Status 98% 31.22 kg/m2 Never Smoker BMI and BSA Data Body Mass Index Body Surface Area  
 31.22 kg/m 2 2.21 m 2 Preferred Pharmacy Pharmacy [facility] Phone [facility] [address] [address] Your Updated Medication List  
  
   
This list is accurate as of 9/20/18  3:00 PM.  Always use your most recent med list.  
  
  
  
  
 escitalopram oxalate 10 mg tablet Commonly known as:  Porter Murphy Take 1 Tab by mouth daily. Prescriptions Sent to Pharmacy Refills  
 escitalopram oxalate (LEXAPRO) 10 mg tablet 2 Sig: Take 1 Tab by mouth daily. Class: Normal  
 Pharmacy: [address] [place] [address], Genaro Young  Ph #: [phone] Route: Oral  
  
We Performed the Following REFERRAL TO OPHTHALMOLOGY [REF57 Custom] Follow-up Instructions Return in about 3 months (around 12/20/2018). Referral Information Referral ID Referred By Referred To  
  
 [id number] [facility], [address], [place]   
   2016 [facility], [address] [place] [address]  Phone: [phone] Fax: [phone] Visits Status Start Date End Date 1 New Request 9/20/18 9/20/19 If your referral has a status of pending review or denied, additional information will be sent to support the outcome of this decision. Patient Instructions Recovering From Depression: Care Instructions Your Care Instructions Taking good care of yourself is important as you recover from depression. In time, your symptoms will fade as your treatment takes hold. Do not give up. Instead, focus your energy on getting better. Your mood will improve. It just takes some time.  Focus on things that can help you feel better, such as being with friends and family, eating well, and getting enough rest. But take things slowly. Do not do too much too soon. You will begin to feel better gradually. Follow-up care is a key part of your treatment and safety. Be sure to make and go to all appointments, and call your doctor if you are having problems. It's also a good idea to know your test results and keep a list of the medicines you take. How can you care for yourself at home? Be realistic · If you have a large task to do, break it up into smaller steps you can handle, and just do what you can. · You may want to put off important decisions until your depression has lifted. If you have plans that will have a major impact on your life, such as marriage, divorce, or a job change, try to wait a bit. Talk it over with friends and loved ones who can help you look at the overall picture first. 
· Reaching out to people for help is important. Do not isolate yourself. Let your family and friends help you. Find someone you can trust and confide in, and talk to that person. · Be patient, and be kind to yourself. Remember that depression is not your fault and is not something you can overcome with willpower alone. Treatment is necessary for depression, just like for any other illness. Feeling better takes time, and your mood will improve little by little. Stay active · Stay busy and get outside. Take a walk, or try some other light exercise. · Talk with your doctor about an exercise program. Exercise can help with mild depression. · Go to a movie or concert. Take part in a Adventism activity or other social gathering. Go to a ball game. · Ask a friend to have dinner with you. Take care of yourself · Eat a balanced diet with plenty of fresh fruits and vegetables, whole grains, and lean protein. If you have lost your appetite, eat small snacks rather than large meals. · Avoid drinking alcohol or using illegal drugs. Do not take medicines that have not been prescribed for you. They may interfere with medicines you may be taking for depression, or they may make your depression worse. · Take your medicines exactly as they are prescribed. You may start to feel better within 1 to 3 weeks of taking antidepressant medicine. But it can take as many as 6 to 8 weeks to see more improvement. If you have questions or concerns about your medicines, or if you do not notice any improvement by 3 weeks, talk to your doctor. · If you have any side effects from your medicine, tell your doctor. Antidepressants can make you feel tired, dizzy, or nervous. Some people have dry mouth, constipation, headaches, sexual problems, or diarrhea. Many of these side effects are mild and will go away on their own after you have been taking the medicine for a few weeks. Some may last longer. Talk to your doctor if side effects are bothering you too much. You might be able to try a different medicine. · Get enough sleep. If you have problems sleeping: ¨ Go to bed at the same time every night, and get up at the same time every morning. ¨ Keep your bedroom dark and quiet. ¨ Do not exercise after 5:00 p.m. ¨ Avoid drinks with caffeine after 5:00 p.m. · Avoid sleeping pills unless they are prescribed by the doctor treating your depression. Sleeping pills may make you groggy during the day, and they may interact with other medicine you are taking. · If you have any other illnesses, such as diabetes, heart disease, or high blood pressure, make sure to continue with your treatment. Tell your doctor about all of the medicines you take, including those with or without a prescription. · Keep the numbers for these national suicide hotlines: 4-362-501-TALK (8-311.188.4104) and 4-514-DDACVFA (4-941.166.1126). If you or someone you know talks about suicide or feeling hopeless, get help right away. When should you call for help? Call 911 anytime you think you may need emergency care. For example, call if: 
  · You feel like hurting yourself or someone else.  
  · Someone you know has depression and is about to attempt or is attempting suicide.  
[facility] your doctor now or seek immediate medical care if: 
  · You hear voices.  
  · Someone you know has depression and: 
¨ Starts to give away his or her possessions. ¨ Uses illegal drugs or drinks alcohol heavily. ¨ Talks or writes about death, including writing suicide notes or talking about guns, knives, or pills. ¨ Starts to spend a lot of time alone. ¨ Acts very aggressively or suddenly appears calm.  
 Watch closely for changes in your health, and be sure to contact your doctor if: 
  · You do not get better as expected. Where can you learn more? Go to http://juneKurobe Pharmaceuticalsadonis.info/. Enter Y822 in the search box to learn more about \"Recovering From Depression: Care Instructions. \" Current as of: December 7, 2017 Content Version: 11.7 © 9434-9844 [employer]. Care instructions adapted under license by [employer] (which disclaims liability or warranty for this information). If you have questions about a medical condition or this instruction, always ask your healthcare professional. [place] [address] any warranty or liability for your use of this information. Introducing [facility]! [address] introduces Exmovere patient portal. Now you can access parts of your medical record, email your doctor's office, and request medication refills online. 1. In your internet browser, go to [email or web address] 2. Click on the First Time User? Click Here link in the Sign In box. You will see the New Member Sign Up page. 3. Enter your Exmovere Access Code exactly as it appears below. You will not need to use this code after youve completed the sign-up process.  If you do not sign up before the expiration date, you must request a new code. · [employer] Access Code: [email or web address] Expires: 12/19/2018  2:38 PM 
 
4. Enter the last four digits of your Social Security Number (xxxx) and Date of Birth (mm/dd/yyyy) as indicated and click Submit. You will be taken to the next sign-up page. 5. Create a [employer] ID. This will be your [employer] login ID and cannot be changed, so think of one that is secure and easy to remember. 6. Create a [employer] password. You can change your password at any time. 7. Enter your Password Reset Question and Answer. This can be used at a later time if you forget your password. 8. Enter your e-mail address. You will receive e-mail notification when new information is available in [address] 9. Click Sign Up. You can now view and download portions of your medical record. 10. Click the Download Summary menu link to download a portable copy of your medical information. If you have questions, please visit the Frequently Asked Questions section of the [employer] website. Remember, [employer] is NOT to be used for urgent needs. For medical emergencies, dial 911. Now available from your iPhone and Android! Please provide this summary of care documentation to your next provider. Your primary care clinician is listed as [address]. If you have any questions after today's visit, please call [phone].

## 2018-09-20 NOTE — PROGRESS NOTES
Misty Denton is a 29 y.o. male  presents for follow up. No seizures or headaches. Allergies   Allergen Reactions    Shellfish Derived Anaphylaxis    Sulfa (Sulfonamide Antibiotics) Hives and Rash    Ceclor [Cefaclor] Unknown (comments)    Erythromycin Unknown (comments)    Other Plant, Animal, Environmental Rash and Itching     Seasonal allergies to 10 types of trees     Outpatient Prescriptions Marked as Taking for the 9/20/18 encounter (Office Visit) with Uma Pride MD   Medication Sig Dispense Refill    citalopram (CELEXA) 10 mg tablet TAKE 1 TABLET BY MOUTH EVERY DAY  5     Patient Active Problem List   Diagnosis Code    Pollen allergies J30.1    Scoliosis M41.9    Depression F32.9    Other hyperlipidemia E78.4    Abnormal thyroid blood test R94.6     Past Medical History:   Diagnosis Date    Chronic ear infection     Knee pain     right knee pain     Scoliosis     curvature of spine     Social History     Social History    Marital status: LEGALLY      Spouse name: N/A    Number of children: N/A    Years of education: N/A     Social History Main Topics    Smoking status: Never Smoker    Smokeless tobacco: Never Used    Alcohol use 0.0 - 0.5 oz/week     0 - 1 Standard drinks or equivalent per week    Drug use: No    Sexual activity: Not Asked     Other Topics Concern    None     Social History Narrative     Family History   Problem Relation Age of Onset    Hypertension Mother     Seizures Mother     Seizures Brother     Hypertension Maternal Grandmother     Cancer Maternal Grandfather         Review of Systems   Constitutional: Negative for chills, fever, malaise/fatigue and weight loss. Eyes: Negative for blurred vision. Respiratory: Negative for shortness of breath and wheezing. Cardiovascular: Negative for chest pain. Gastrointestinal: Negative for nausea and vomiting. Musculoskeletal: Negative for myalgias. Skin: Negative for rash. Neurological: Negative for weakness. Vitals:    09/20/18 1439   BP: 108/78   Pulse: (!) 57   Resp: 12   Temp: 98.4 °F (36.9 °C)   TempSrc: Oral   SpO2: 98%   Weight: 217 lb 9.6 oz (98.7 kg)   Height: 5' 10\" (1.778 m)   PainSc:   0 - No pain       Physical Exam   Constitutional: He is well-developed, well-nourished, and in no distress. HENT:   Right Ear: External ear normal.   Left Ear: External ear normal.   Nose: Nose normal.   Mouth/Throat: Oropharynx is clear and moist.   Cardiovascular: Normal rate, regular rhythm and normal heart sounds. Pulmonary/Chest: Effort normal and breath sounds normal.   Musculoskeletal: Normal range of motion. Neurological: He is alert. Gait normal.   Skin: Skin is warm and dry. Psychiatric: Mood, memory, affect and judgment normal.   Nursing note and vitals reviewed. Assessment/Plan      ICD-10-CM ICD-9-CM    1. Other depression F32.89 311 escitalopram oxalate (LEXAPRO) 10 mg tablet   2. Decreased vision H54.7 369.9 REFERRAL TO OPHTHALMOLOGY   3. Non-seasonal allergic rhinitis due to pollen J30.1 477.0      I have discussed the diagnosis with the patient and the intended plan of care as seen in the above orders. The patient has received an after-visit summary and questions were answered concerning future plans. I have discussed medication, side effects, and warnings with the patient in detail. The patient verbalized understanding and is in agreement with the plan of care. The patient will contact the office with any additional concerns. Follow-up Disposition:  Return in about 3 months (around 12/20/2018).   lab results and schedule of future lab studies reviewed with patient    Daniela Jordan MD

## 2018-09-20 NOTE — PROGRESS NOTES
Patient here for 3 month f/u. No concerns. 1. Have you been to the ER, urgent care clinic since your last visit? Hospitalized since your last visit? No  2. Have you seen or consulted any other health care providers outside of the [facility] since your last visit? Include any pap smears or colon screening. No    \Medication reconciliation has been completed with patient. Care team discussed/updated as well as pharmacy. Care everywhere has been ran. Health Maintenance reviewed - Flu vaccine declined.

## 2018-09-20 NOTE — PATIENT INSTRUCTIONS
Recovering From Depression: Care Instructions  Your Care Instructions    Taking good care of yourself is important as you recover from depression. In time, your symptoms will fade as your treatment takes hold. Do not give up. Instead, focus your energy on getting better. Your mood will improve. It just takes some time. Focus on things that can help you feel better, such as being with friends and family, eating well, and getting enough rest. But take things slowly. Do not do too much too soon. You will begin to feel better gradually. Follow-up care is a key part of your treatment and safety. Be sure to make and go to all appointments, and call your doctor if you are having problems. It's also a good idea to know your test results and keep a list of the medicines you take. How can you care for yourself at home? Be realistic  · If you have a large task to do, break it up into smaller steps you can handle, and just do what you can. · You may want to put off important decisions until your depression has lifted. If you have plans that will have a major impact on your life, such as marriage, divorce, or a job change, try to wait a bit. Talk it over with friends and loved ones who can help you look at the overall picture first.  · Reaching out to people for help is important. Do not isolate yourself. Let your family and friends help you. Find someone you can trust and confide in, and talk to that person. · Be patient, and be kind to yourself. Remember that depression is not your fault and is not something you can overcome with willpower alone. Treatment is necessary for depression, just like for any other illness. Feeling better takes time, and your mood will improve little by little. Stay active  · Stay busy and get outside. Take a walk, or try some other light exercise. · Talk with your doctor about an exercise program. Exercise can help with mild depression. · Go to a movie or concert.  Take part in a Yazidism activity or other social gathering. Go to a Tomfoolery game. · Ask a friend to have dinner with you. Take care of yourself  · Eat a balanced diet with plenty of fresh fruits and vegetables, whole grains, and lean protein. If you have lost your appetite, eat small snacks rather than large meals. · Avoid drinking alcohol or using illegal drugs. Do not take medicines that have not been prescribed for you. They may interfere with medicines you may be taking for depression, or they may make your depression worse. · Take your medicines exactly as they are prescribed. You may start to feel better within 1 to 3 weeks of taking antidepressant medicine. But it can take as many as 6 to 8 weeks to see more improvement. If you have questions or concerns about your medicines, or if you do not notice any improvement by 3 weeks, talk to your doctor. · If you have any side effects from your medicine, tell your doctor. Antidepressants can make you feel tired, dizzy, or nervous. Some people have dry mouth, constipation, headaches, sexual problems, or diarrhea. Many of these side effects are mild and will go away on their own after you have been taking the medicine for a few weeks. Some may last longer. Talk to your doctor if side effects are bothering you too much. You might be able to try a different medicine. · Get enough sleep. If you have problems sleeping:  ¨ Go to bed at the same time every night, and get up at the same time every morning. ¨ Keep your bedroom dark and quiet. ¨ Do not exercise after 5:00 p.m. ¨ Avoid drinks with caffeine after 5:00 p.m. · Avoid sleeping pills unless they are prescribed by the doctor treating your depression. Sleeping pills may make you groggy during the day, and they may interact with other medicine you are taking. · If you have any other illnesses, such as diabetes, heart disease, or high blood pressure, make sure to continue with your treatment.  Tell your doctor about all of the medicines you take, including those with or without a prescription. · Keep the numbers for these national suicide hotlines: 7-669-183-TALK (6-777.711.4796) and 7-558-BNSDDQP (6-137.516.1218). If you or someone you know talks about suicide or feeling hopeless, get help right away. When should you call for help? Call 911 anytime you think you may need emergency care. For example, call if:    · You feel like hurting yourself or someone else.     · Someone you know has depression and is about to attempt or is attempting suicide.   [facility] your doctor now or seek immediate medical care if:    · You hear voices.     · Someone you know has depression and:  ¨ Starts to give away his or her possessions. ¨ Uses illegal drugs or drinks alcohol heavily. ¨ Talks or writes about death, including writing suicide notes or talking about guns, knives, or pills. ¨ Starts to spend a lot of time alone. ¨ Acts very aggressively or suddenly appears calm.    Watch closely for changes in your health, and be sure to contact your doctor if:    · You do not get better as expected. Where can you learn more? Go to http://june-adonis.info/. Enter P023 in the search box to learn more about \"Recovering From Depression: Care Instructions. \"  Current as of: December 7, 2017  Content Version: 11.7  © 1882-7331 [employer]. Care instructions adapted under license by [employer] (which disclaims liability or warranty for this information). If you have questions about a medical condition or this instruction, always ask your healthcare professional. [address] any warranty or liability for your use of this information.

## 2018-10-12 ENCOUNTER — OFFICE VISIT (OUTPATIENT)
Dept: FAMILY MEDICINE CLINIC | Age: 33
End: 2018-10-12

## 2018-10-12 VITALS
HEART RATE: 66 BPM | DIASTOLIC BLOOD PRESSURE: 78 MMHG | RESPIRATION RATE: 10 BRPM | BODY MASS INDEX: 31.47 KG/M2 | OXYGEN SATURATION: 98 % | TEMPERATURE: 98.2 F | SYSTOLIC BLOOD PRESSURE: 100 MMHG | WEIGHT: 219.8 LBS | HEIGHT: 70 IN

## 2018-10-12 DIAGNOSIS — F41.9 ANXIETY: Primary | ICD-10-CM

## 2018-10-12 DIAGNOSIS — F33.9 EPISODE OF RECURRENT MAJOR DEPRESSIVE DISORDER, UNSPECIFIED DEPRESSION EPISODE SEVERITY (HCC): ICD-10-CM

## 2018-10-12 DIAGNOSIS — H92.09 OTALGIA, UNSPECIFIED LATERALITY: ICD-10-CM

## 2018-10-12 RX ORDER — NEOMYCIN SULFATE, POLYMYXIN B SULFATE AND HYDROCORTISONE 10; 3.5; 1 MG/ML; MG/ML; [USP'U]/ML
3 SUSPENSION/ DROPS AURICULAR (OTIC) 4 TIMES DAILY
Qty: 10 ML | Refills: 0 | Status: SHIPPED | OUTPATIENT
Start: 2018-10-12 | End: 2018-10-22

## 2018-10-12 RX ORDER — CIPROFLOXACIN AND DEXAMETHASONE 3; 1 MG/ML; MG/ML
2 SUSPENSION/ DROPS AURICULAR (OTIC) 2 TIMES DAILY
Qty: 7 ML | Refills: 0 | Status: SHIPPED | OUTPATIENT
Start: 2018-10-12 | End: 2018-10-12

## 2018-10-12 RX ORDER — CITALOPRAM 10 MG/1
TABLET ORAL
Refills: 5 | COMMUNITY
Start: 2018-08-13 | End: 2018-10-12 | Stop reason: ALTCHOICE

## 2018-10-12 NOTE — PATIENT INSTRUCTIONS

## 2018-10-12 NOTE — PROGRESS NOTES
Xin Shaikh is a 29 y.o. male  presents for right ear pain. He has some drainage. Has assoc pain. No decrease in hearing. No fever or chills. He has been having sxs for about 2 weeks. No ideas of suicide or homicide. Allergies   Allergen Reactions    Shellfish Derived Anaphylaxis    Sulfa (Sulfonamide Antibiotics) Hives and Rash    Ceclor [Cefaclor] Unknown (comments)    Erythromycin Unknown (comments)    Other Plant, Animal, Environmental Rash and Itching     Seasonal allergies to 10 types of trees     Outpatient Prescriptions Marked as Taking for the 10/12/18 encounter (Office Visit) with Malorie Moore MD   Medication Sig Dispense Refill    escitalopram oxalate (LEXAPRO) 10 mg tablet Take 1 Tab by mouth daily. 30 Tab 2     Patient Active Problem List   Diagnosis Code    Pollen allergies J30.1    Scoliosis M41.9    Depression F32.9    Other hyperlipidemia E78.49    Abnormal thyroid blood test R79.89    Non-seasonal allergic rhinitis due to pollen J30.1     Past Medical History:   Diagnosis Date    Chronic ear infection     Knee pain     right knee pain     Scoliosis     curvature of spine     Social History     Social History    Marital status: LEGALLY      Spouse name: N/A    Number of children: N/A    Years of education: N/A     Social History Main Topics    Smoking status: Never Smoker    Smokeless tobacco: Never Used    Alcohol use 0.0 - 0.5 oz/week     0 - 1 Standard drinks or equivalent per week    Drug use: No    Sexual activity: Not Asked     Other Topics Concern    None     Social History Narrative     Family History   Problem Relation Age of Onset    Hypertension Mother     Seizures Mother     Seizures Brother     Hypertension Maternal Grandmother     Cancer Maternal Grandfather         Review of Systems   Constitutional: Negative for chills, fever, malaise/fatigue and weight loss. HENT: Positive for ear discharge and ear pain.     Eyes: Negative for blurred vision. Respiratory: Negative for cough, shortness of breath and wheezing. Cardiovascular: Negative for chest pain. Gastrointestinal: Negative for nausea and vomiting. Musculoskeletal: Negative for myalgias. Skin: Negative for rash. Neurological: Negative for weakness. Psychiatric/Behavioral: Negative for depression, hallucinations, substance abuse and suicidal ideas. The patient is nervous/anxious. The patient does not have insomnia. Vitals:    10/12/18 1440   BP: 100/78   Pulse: 66   Resp: 10   Temp: 98.2 °F (36.8 °C)   TempSrc: Oral   SpO2: 98%   Weight: 219 lb 12.8 oz (99.7 kg)   Height: 5' 10\" (1.778 m)   PainSc:   0 - No pain       Physical Exam   Constitutional: He is oriented to person, place, and time and well-developed, well-nourished, and in no distress. Neck: Normal range of motion. Neck supple. No thyromegaly present. Cardiovascular: Normal rate, regular rhythm and normal heart sounds. Pulmonary/Chest: Effort normal and breath sounds normal.   Musculoskeletal: Normal range of motion. Neurological: He is alert and oriented to person, place, and time. Skin: Skin is warm and dry. Psychiatric: Mood, memory, affect and judgment normal.   Nursing note and vitals reviewed. Assessment/Plan      ICD-10-CM ICD-9-CM    1. Anxiety F41.9 300.00 REFERRAL TO PSYCHOLOGY   2. Otalgia, unspecified laterality H92.09 388.70 neomycin-polymyxin-hydrocortisone, buffered, (PEDIOTIC) 3.5-10,000-1 mg/mL-unit/mL-% otic suspension      DISCONTINUED: ciprofloxacin-dexamethasone (CIPRODEX) 0.3-0.1 % otic suspension   3. Episode of recurrent major depressive disorder, unspecified depression episode severity ([facility] F33.9 296.30      I have discussed the diagnosis with the patient and the intended plan of care as seen in the above orders. The patient has received an after-visit summary and questions were answered concerning future plans.  I have discussed medication, side effects, and warnings with the patient in detail. The patient verbalized understanding and is in agreement with the plan of care. The patient will contact the office with any additional concerns. Follow-up Disposition:  Return in about 1 month (around 11/12/2018).   lab results and schedule of future lab studies reviewed with patient    Isiah Ceron MD

## 2018-10-12 NOTE — PROGRESS NOTES
Patient here to discuss his medication celexa. 1. Have you been to the ER, urgent care clinic since your last visit? Hospitalized since your last visit? No  2. Have you seen or consulted any other health care providers outside of the [address] [place] [address] since your last visit? Include any pap smears or colon screening. No    Medication reconciliation has been completed with patient. Care team discussed/updated as well as pharmacy. Care everywhere has been ran.

## 2018-10-12 NOTE — MR AVS SNAPSHOT
[address] [place] [address] Road 
[phone] Patient: Myles Muse MRN: [id number] FYI:[birth year] Visit Information Date & Time Provider Department Dept. Phone Encounter #  
 10/12/2018  2:30 PM Eda Kanner, MD [facility] [phone] [id number] Follow-up Instructions Return in about 1 month (around 11/12/2018). Your Appointments 11/9/2018  8:00 AM  
FOLLOW UP EXAM with Eda Kanner, MD  
[facility] [address] [place] [address]) Appt Note: 1mo f/u for anxiety [facility] [address] [facility] [address] [phone]  
  
   
 [address] [address]  
  
    
 12/20/2018 11:00 AM  
FOLLOW UP EXAM with Eda Kanner, MD  
[facility]) Appt Note: 3 month f/u  
 [address] [facility] [address] [place] [address]  
[phone] Upcoming Health Maintenance Date Due Influenza Age 5 to Adult 9/30/2019* DTaP/Tdap/Td series (2 - Td) 8/3/2028 *Topic was postponed. The date shown is not the original due date. Allergies as of 10/12/2018  Review Complete On: 10/12/2018 By: Eda Kanner, MD  
  
 Severity Noted Reaction Type Reactions Shellfish Derived High 06/06/2018    Anaphylaxis Sulfa (Sulfonamide Antibiotics) High 06/11/2014   Systemic Hives, Rash Ceclor [Cefaclor] Medium 06/11/2014   Systemic Unknown (comments) Erythromycin Medium 06/11/2014   Systemic Unknown (comments) Other Plant, Animal, Environmental Medium 06/11/2014   Systemic Rash, Itching Seasonal allergies to 10 types of trees Current Immunizations  Never Reviewed Name Date Tdap 8/3/2018 Not reviewed this visit You Were Diagnosed With   
  
 Codes Comments Anxiety    -  Primary ICD-10-CM: F41.9 ICD-9-CM: 300.00 Otalgia, unspecified laterality     ICD-10-CM: H92.09 
ICD-9-CM: 388.70 Episode of recurrent major depressive disorder, unspecified depression episode severity ([facility]     ICD-10-CM: F33.9 ICD-9-CM: 296.30 Vitals BP Pulse Temp Resp Height(growth percentile) Weight(growth percentile) 100/78 66 98.2 °F (36.8 °C) (Oral) 10 5' 10\" (1.778 m) 219 lb 12.8 oz (99.7 kg) SpO2 BMI Smoking Status 98% 31.54 kg/m2 Never Smoker Vitals History BMI and BSA Data Body Mass Index Body Surface Area 31.54 kg/m 2 2.22 m 2 Preferred Pharmacy Pharmacy Name Phone [id number], [address] [place] [address] Your Updated Medication List  
  
   
This list is accurate as of 10/12/18  3:07 PM.  Always use your most recent med list.  
  
  
  
  
 ciprofloxacin-dexamethasone 0.3-0.1 % otic suspension Commonly known as:  Leanne Waller Administer 2 Drops into each ear two (2) times a day for 7 days. escitalopram oxalate 10 mg tablet Commonly known as:  Monique Tubbs Take 1 Tab by mouth daily. Prescriptions Sent to Pharmacy Refills  
 ciprofloxacin-dexamethasone (CIPRODEX) 0.3-0.1 % otic suspension 0 Sig: Administer 2 Drops into each ear two (2) times a day for 7 days. Class: Normal  
 Pharmacy: [address] [place] [address], Gen[facility] Young  Ph #: [phone] Route: Both Ears We Performed the Following REFERRAL TO PSYCHOLOGY [JCR11 Custom] Comments:  
 Please evaluate patient for anxiety and depression. Follow-up Instructions Return in about 1 month (around 11/12/2018). Referral Information Referral ID Referred By Referred To  
  
 [id number] TAMRA Coon Not Available Visits Status Start Date End Date 1 New Request 10/12/18 10/12/19  If your referral has a status of pending review or denied, additional information will be sent to support the outcome of this decision. Patient Instructions Learning About Anxiety Disorders What are anxiety disorders? Anxiety disorders are a type of medical problem. They cause severe anxiety. When you feel anxious, you feel that something bad is about to happen. This feeling interferes with your life. These disorders include: · Generalized anxiety disorder. You feel worried and stressed about many everyday events and activities. This goes on for several months and disrupts your life on most days. · Panic disorder. You have repeated panic attacks. A panic attack is a sudden, intense fear or anxiety. It may make you feel short of breath. Your heart may pound. · Social anxiety disorder. You feel very anxious about what you will say or do in front of people. For example, you may be scared to talk or eat in public. This problem affects your daily life. · Phobias. You are very scared of a specific object, situation, or activity. For example, you may fear spiders, high places, or small spaces. What are the symptoms? Generalized anxiety disorder Symptoms may include: · Feeling worried and stressed about many things almost every day. · Feeling tired or irritable. You may have a hard time concentrating. · Having headaches or muscle aches. · Having a hard time getting to sleep or staying asleep. Panic disorder You may have repeated panic attacks when there is no reason for feeling afraid. You may change your daily activities because you worry that you will have another attack. Symptoms may include: 
· Intense fear, terror, or anxiety. · Trouble breathing or very fast breathing. · Chest pain or tightness. · A heartbeat that races or is not regular. Social anxiety disorder Symptoms may include: · Fear about a social situation, such as eating in front of others or speaking in public. You may worry a lot. Or you may be afraid that something bad will happen. · Anxiety that can cause you to blush, sweat, and feel shaky. · A heartbeat that is faster than normal. 
· A hard time focusing. Phobias Symptoms may include: · More fear than most people of being around an object, being in a situation, or doing an activity. You might also be stressed about the chance of being around the thing you fear. · Worry about losing control, panicking, fainting, or having physical symptoms like a faster heartbeat when you are around the situation or object. How are these disorders treated? Anxiety disorders can be treated with medicines or counseling. A combination of both may be used. Medicines may include: · Antidepressants. These may help your symptoms by keeping chemicals in your brain in balance. · Benzodiazepines. These may give you short-term relief of your symptoms. Some people use cognitive-behavioral therapy. A therapist helps you learn to change stressful or bad thoughts into helpful thoughts. Lead a healthy lifestyle A healthy lifestyle may help you feel better. · Get at least 30 minutes of exercise on most days of the week. Walking is a good choice. · Eat a healthy diet. Include fruits, vegetables, lean proteins, and whole grains in your diet each day. · Try to go to bed at the same time every night. Try for 8 hours of sleep a night. · Find ways to manage stress. Try relaxation exercises. · Avoid alcohol and illegal drugs. Follow-up care is a key part of your treatment and safety. Be sure to make and go to all appointments, and call your doctor if you are having problems. It's also a good idea to know your test results and keep a list of the medicines you take. Where can you learn more? Go to http://june-adonis.info/. Enter Z737 in the search box to learn more about \"Learning About Anxiety Disorders. \" Current as of: December 7, 2017 Content Version: 11.8 © 8624-5201 [employer], [employer].  Care instructions adapted under license by [address] (which disclaims liability or warranty for this information). If you have questions about a medical condition or this instruction, always ask your healthcare professional. [address] any warranty or liability for your use of this information. Introducing [facility]! [facility] introduces [employer] patient portal. Now you can access parts of your medical record, email your doctor's office, and request medication refills online. 1. In your internet browser, go to [email or web address] 2. Click on the First Time User? Click Here link in the Sign In box. You will see the New Member Sign Up page. 3. Enter your [employer] Access Code exactly as it appears below. You will not need to use this code after youve completed the sign-up process. If you do not sign up before the expiration date, you must request a new code. · [employer] Access Code: [email or web address] Expires: 12/19/2018  2:38 PM 
 
4. Enter the last four digits of your Social Security Number (xxxx) and Date of Birth (mm/dd/yyyy) as indicated and click Submit. You will be taken to the next sign-up page. 5. Create a [facility] ID. This will be your [employer] login ID and cannot be changed, so think of one that is secure and easy to remember. 6. Create a [employer] password. You can change your password at any time. 7. Enter your Password Reset Question and Answer. This can be used at a later time if you forget your password. 8. Enter your e-mail address. You will receive e-mail notification when new information is available in [address] 9. Click Sign Up. You can now view and download portions of your medical record. 10. Click the Download Summary menu link to download a portable copy of your medical information. If you have questions, please visit the Frequently Asked Questions section of the [employer] website.  Remember, [employer] is NOT to be used for urgent needs. For medical emergencies, dial 911. Now available from your iPhone and Android! Please provide this summary of care documentation to your next provider. Your primary care clinician is listed as [id number] [facility]. If you have any questions after today's visit, please call [phone].

## 2018-10-15 ENCOUNTER — TELEPHONE (OUTPATIENT)
Dept: FAMILY MEDICINE CLINIC | Age: 33
End: 2018-10-15

## 2018-10-15 NOTE — TELEPHONE ENCOUNTER
Ms. Ken Zepeda (Mother) is calling to discuss the new medication Dr. Clay White prescribed for Mabel Edwards. She would like Dr. Clay White to return her call.

## 2018-10-17 NOTE — TELEPHONE ENCOUNTER
Pt's mom called stating that dr Germain Sam changed pt from celexa to lexapro. She thinks the other did better. She did mention that dr Germain Sam was going to put in a referral to a psychiatrist for him. I told her someone will contact pt with any referral info.

## 2018-10-24 ENCOUNTER — TELEPHONE (OUTPATIENT)
Dept: FAMILY MEDICINE CLINIC | Age: 33
End: 2018-10-24

## 2018-10-24 NOTE — TELEPHONE ENCOUNTER
Pt's mom would like to know about an abx for pt's ear. He cannot use the ear drops. He is allergic to sulfa, mycins. Please advise.

## 2018-10-24 NOTE — TELEPHONE ENCOUNTER
Referral request has been faxed to [phone] [employer] who will contact patient for an appointment.

## 2018-10-26 NOTE — TELEPHONE ENCOUNTER
Hyun Mclaughlin MD sent to Hao Laird LPN   Caller: Unspecified (2 days ago,  3:14 PM)             Please switch to Ciprodex drops    Previous Messages       Told Ms. Donnie Evans about the drops. She said she is not sure if he still needs anything. If he does he will need an oral abx instead. She will speak with her son to see how he is doing and call back.

## 2019-01-30 ENCOUNTER — TELEPHONE (OUTPATIENT)
Dept: FAMILY MEDICINE CLINIC | Age: 34
End: 2019-01-30

## 2019-01-30 ENCOUNTER — OFFICE VISIT (OUTPATIENT)
Dept: FAMILY MEDICINE CLINIC | Age: 34
End: 2019-01-30

## 2019-01-30 VITALS
TEMPERATURE: 99 F | OXYGEN SATURATION: 99 % | RESPIRATION RATE: 16 BRPM | HEIGHT: 70 IN | DIASTOLIC BLOOD PRESSURE: 70 MMHG | BODY MASS INDEX: 31.7 KG/M2 | SYSTOLIC BLOOD PRESSURE: 116 MMHG | WEIGHT: 221.4 LBS | HEART RATE: 82 BPM

## 2019-01-30 DIAGNOSIS — J01.80 OTHER SUBACUTE SINUSITIS: Primary | ICD-10-CM

## 2019-01-30 RX ORDER — DOXYCYCLINE 100 MG/1
100 CAPSULE ORAL 2 TIMES DAILY
Qty: 20 CAP | Refills: 0 | Status: SHIPPED | OUTPATIENT
Start: 2019-01-30 | End: 2019-02-09

## 2019-01-30 NOTE — PROGRESS NOTES
Chief Complaint   Patient presents with    Cough     C/o productivc cough x 2 weeks mucus decribed as milky yellow. Cough is worse at night. 1. Have you been to the ER, urgent care clinic since your last visit? Hospitalized since your last visit? No  2. Have you seen or consulted any other health care providers outside of the [address] [place] [address] since your last visit? Include any pap smears or colon screening. No    Medication reconciliation has been completed with patient. Care team discussed/updated as well as pharmacy.

## 2019-01-30 NOTE — TELEPHONE ENCOUNTER
Patient mother called stating that Rx Doxycycline  that was sent in today for her son ,insurance is not covering the Medication and would like for Dr. Josefina Metzger to send something else in.

## 2019-01-30 NOTE — PATIENT INSTRUCTIONS
Sinusitis: Care Instructions  Your Care Instructions    Sinusitis is an infection of the lining of the sinus cavities in your head. Sinusitis often follows a cold. It causes pain and pressure in your head and face. In most cases, sinusitis gets better on its own in 1 to 2 weeks. But some mild symptoms may last for several weeks. Sometimes antibiotics are needed. Follow-up care is a key part of your treatment and safety. Be sure to make and go to all appointments, and call your doctor if you are having problems. It's also a good idea to know your test results and keep a list of the medicines you take. How can you care for yourself at home? · Take an over-the-counter pain medicine, such as acetaminophen (Tylenol), ibuprofen (Advil, Motrin), or naproxen (Aleve). Read and follow all instructions on the label. · If the doctor prescribed antibiotics, take them as directed. Do not stop taking them just because you feel better. You need to take the full course of antibiotics. · Be careful when taking over-the-counter cold or flu medicines and Tylenol at the same time. Many of these medicines have acetaminophen, which is Tylenol. Read the labels to make sure that you are not taking more than the recommended dose. Too much acetaminophen (Tylenol) can be harmful. · Breathe warm, moist air from a steamy shower, a hot bath, or a sink filled with hot water. Avoid cold, dry air. Using a humidifier in your home may help. Follow the directions for cleaning the machine. · Use saline (saltwater) nasal washes to help keep your nasal passages open and wash out mucus and bacteria. You can buy saline nose drops at a grocery store or drugstore. Or you can make your own at home by adding 1 teaspoon of salt and 1 teaspoon of baking soda to 2 cups of distilled water. If you make your own, fill a bulb syringe with the solution, insert the tip into your nostril, and squeeze gently. [place] your nose.   · Put a hot, wet towel or a warm gel pack on your face 3 or 4 times a day for 5 to 10 minutes each time. · Try a decongestant nasal spray like oxymetazoline (Afrin). Do not use it for more than 3 days in a row. Using it for more than 3 days can make your congestion worse. When should you call for help? Call your doctor now or seek immediate medical care if:    · You have new or worse swelling or redness in your face or around your eyes.     · You have a new or higher fever.    Watch closely for changes in your health, and be sure to contact your doctor if:    · You have new or worse facial pain.     · The mucus from your nose becomes thicker (like pus) or has new blood in it.     · You are not getting better as expected. Where can you learn more? Go to http://june-adonis.info/. Enter T753 in the search box to learn more about \"Sinusitis: Care Instructions. \"  Current as of: March 27, 2018  Content Version: 11.9  © 6388-8776 [employer]. Care instructions adapted under license by [employer] (which disclaims liability or warranty for this information). If you have questions about a medical condition or this instruction, always ask your healthcare professional. [place] [address] any warranty or liability for your use of this information.

## 2019-01-30 NOTE — PROGRESS NOTES
Gio Mccormack is a 29 y.o. male  presents for cough. He has had sxs for about 2 weeks. He has assoc congestion. Sxs are getting worse. He has tried otc meds but it has not helped. He thinks that he got it from work. Allergies   Allergen Reactions    Shellfish Derived Anaphylaxis    Sulfa (Sulfonamide Antibiotics) Hives and Rash    Ceclor [Cefaclor] Unknown (comments)    Erythromycin Unknown (comments)    Other Plant, Animal, Environmental Rash and Itching     Seasonal allergies to 10 types of trees     Outpatient Medications Marked as Taking for the 1/30/19 encounter (Office Visit) with Jaimie Huang MD   Medication Sig Dispense Refill    escitalopram oxalate (LEXAPRO) 10 mg tablet Take 1 Tab by mouth daily. 30 Tab 2     Patient Active Problem List   Diagnosis Code    Pollen allergies J30.1    Scoliosis M41.9    Depression F32.9    Other hyperlipidemia E78.49    Abnormal thyroid blood test R79.89    Non-seasonal allergic rhinitis due to pollen J30.1     Past Medical History:   Diagnosis Date    Chronic ear infection     Knee pain     right knee pain     Scoliosis     curvature of spine     Social History     Socioeconomic History    Marital status: LEGALLY      Spouse name: Not on file    Number of children: Not on file    Years of education: Not on file    Highest education level: Not on file   Tobacco Use    Smoking status: Never Smoker    Smokeless tobacco: Never Used   Substance and Sexual Activity    Alcohol use: Yes     Alcohol/week: 0.0 - 0.5 oz    Drug use: No     Family History   Problem Relation Age of Onset    Hypertension Mother     Seizures Mother     Seizures Brother     Hypertension Maternal Grandmother     Cancer Maternal Grandfather         Review of Systems   Constitutional: Negative for chills, fever, malaise/fatigue and weight loss. HENT: Positive for congestion and sinus pain. Eyes: Negative for blurred vision.    Respiratory: Positive for cough. Negative for shortness of breath and wheezing. Cardiovascular: Negative for chest pain. Gastrointestinal: Negative for nausea and vomiting. Musculoskeletal: Negative for myalgias. Skin: Negative for rash. Neurological: Negative for weakness. Vitals:    01/30/19 1114   BP: 116/70   Pulse: 82   Resp: 16   Temp: 99 °F (37.2 °C)   TempSrc: Oral   SpO2: 99%   Weight: 221 lb 6.4 oz (100.4 kg)   Height: 5' 10\" (1.778 m)   PainSc:   0 - No pain       Physical Exam   Constitutional: He is oriented to person, place, and time and well-developed, well-nourished, and in no distress. HENT:   Right Ear: External ear normal.   Left Ear: External ear normal.   Nose: Nose normal.   Mouth/Throat: Oropharynx is clear and moist.   Eyes: Conjunctivae and EOM are normal. Pupils are equal, round, and reactive to light. Neck: Normal range of motion. Neck supple. No thyromegaly present. Cardiovascular: Normal rate, regular rhythm and normal heart sounds. Pulmonary/Chest: Effort normal and breath sounds normal.   Musculoskeletal: Normal range of motion. Neurological: He is alert and oriented to person, place, and time. Skin: Skin is warm and dry. Nursing note and vitals reviewed. Assessment/Plan      ICD-10-CM ICD-9-CM    1. Other subacute sinusitis J01.80 461.8 doxycycline (VIBRAMYCIN) 100 mg capsule     I have discussed the diagnosis with the patient and the intended plan of care as seen in the above orders. The patient has received an after-visit summary and questions were answered concerning future plans. I have discussed medication, side effects, and warnings with the patient in detail. The patient verbalized understanding and is in agreement with the plan of care. The patient will contact the office with any additional concerns.       Follow-up Disposition:  Return if symptoms worsen or fail to improve.  lab results and schedule of future lab studies reviewed with patient    Tameka Vasquez MD

## 2019-01-31 ENCOUNTER — TELEPHONE (OUTPATIENT)
Dept: FAMILY MEDICINE CLINIC | Age: 34
End: 2019-01-31

## 2019-01-31 RX ORDER — CIPROFLOXACIN 250 MG/1
250 TABLET, FILM COATED ORAL EVERY 12 HOURS
Qty: 20 TAB | Refills: 0 | Status: SHIPPED | OUTPATIENT
Start: 2019-01-31 | End: 2019-02-10

## 2019-01-31 NOTE — TELEPHONE ENCOUNTER
Fax received from [facility] meds stating prior [address] is required for the Doxycycline. Submit a PA request  1. Go to key. [employer] (deja mi) and click \"Enter a Key\"  2. Patient last name: Theresa Jimenez      : 1985      Key: [id number]  3.  Click \"start a PA\", complete the form, and \"send to plan\"

## 2019-01-31 NOTE — TELEPHONE ENCOUNTER
Ms. Santo Chu is calling would like an answer today. She states Rochelle Restrepo still has a hacking cough.

## 2019-02-04 ENCOUNTER — TELEPHONE (OUTPATIENT)
Dept: FAMILY MEDICINE CLINIC | Age: 34
End: 2019-02-04

## 2019-02-04 NOTE — TELEPHONE ENCOUNTER
Fax received from [facility] stating prior Nahomi Spikes is required for the doxycycline  Submit a PA request  1. Go to key. [employer] and click \"Enter a Key\"  2. Patient last name: Theresa Jimenez      : [id number]      Key: [id number]  3.  Click \"start a PA\", complete the form, and \"send to plan\"

## 2019-02-06 DIAGNOSIS — F32.89 OTHER DEPRESSION: ICD-10-CM

## 2019-02-07 RX ORDER — ESCITALOPRAM OXALATE 10 MG/1
TABLET ORAL
Qty: 30 TAB | Refills: 0 | Status: SHIPPED | OUTPATIENT
Start: 2019-02-07 | End: 2021-05-25 | Stop reason: SDUPTHER

## 2019-07-30 ENCOUNTER — OFFICE VISIT (OUTPATIENT)
Dept: FAMILY MEDICINE CLINIC | Age: 34
End: 2019-07-30

## 2019-07-30 VITALS
RESPIRATION RATE: 12 BRPM | OXYGEN SATURATION: 98 % | SYSTOLIC BLOOD PRESSURE: 120 MMHG | TEMPERATURE: 98.3 F | BODY MASS INDEX: 31.77 KG/M2 | HEART RATE: 78 BPM | DIASTOLIC BLOOD PRESSURE: 78 MMHG | HEIGHT: 70 IN

## 2019-07-30 DIAGNOSIS — M25.561 CHRONIC PAIN OF RIGHT KNEE: Primary | ICD-10-CM

## 2019-07-30 DIAGNOSIS — G89.29 CHRONIC PAIN OF RIGHT KNEE: Primary | ICD-10-CM

## 2019-07-30 DIAGNOSIS — M54.40 BILATERAL LOW BACK PAIN WITH SCIATICA, SCIATICA LATERALITY UNSPECIFIED, UNSPECIFIED CHRONICITY: ICD-10-CM

## 2019-07-30 RX ORDER — DICLOFENAC SODIUM 50 MG/1
50 TABLET, DELAYED RELEASE ORAL 2 TIMES DAILY
Qty: 40 TAB | Refills: 1 | Status: SHIPPED | OUTPATIENT
Start: 2019-07-30 | End: 2019-09-13 | Stop reason: ALTCHOICE

## 2019-07-30 RX ORDER — CHOLECALCIFEROL (VITAMIN D3) 125 MCG
CAPSULE ORAL
COMMUNITY
End: 2019-07-30 | Stop reason: ALTCHOICE

## 2019-07-30 NOTE — PROGRESS NOTES
Jake Bower is a 29 y.o. male  presents for knee and back pain. Right is worse than left. He also need referral for eye exam.      Allergies   Allergen Reactions    Shellfish Derived Anaphylaxis    Sulfa (Sulfonamide Antibiotics) Hives and Rash    Ceclor [Cefaclor] Unknown (comments)    Erythromycin Unknown (comments)    Other Plant, Animal, Environmental Rash and Itching     Seasonal allergies to 10 types of trees     Outpatient Medications Marked as Taking for the 7/30/19 encounter (Office Visit) with Mike Sheffield MD   Medication Sig Dispense Refill    naproxen sodium (ALEVE) 220 mg cap Take  by mouth.  escitalopram oxalate (LEXAPRO) 10 mg tablet TAKE ONE TABLET BY MOUTH DAILY 30 Tab 0     Patient Active Problem List   Diagnosis Code    Pollen allergies J30.1    Scoliosis M41.9    Depression F32.9    Other hyperlipidemia E78.49    Abnormal thyroid blood test R79.89    Non-seasonal allergic rhinitis due to pollen J30.1     Past Medical History:   Diagnosis Date    Chronic ear infection     Knee pain     right knee pain     Scoliosis     curvature of spine     Social History     Socioeconomic History    Marital status: LEGALLY      Spouse name: Not on file    Number of children: Not on file    Years of education: Not on file    Highest education level: Not on file   Tobacco Use    Smoking status: Never Smoker    Smokeless tobacco: Never Used   Substance and Sexual Activity    Alcohol use: Yes     Alcohol/week: 0.0 - 0.8 standard drinks    Drug use: No     Family History   Problem Relation Age of Onset    Hypertension Mother     Seizures Mother     Seizures Brother     Hypertension Maternal Grandmother     Cancer Maternal Grandfather         Review of Systems   Constitutional: Negative for chills, fever, malaise/fatigue and weight loss. Eyes: Negative for blurred vision. Respiratory: Negative for shortness of breath and wheezing.     Cardiovascular: Negative for chest pain. Gastrointestinal: Negative for nausea and vomiting. Musculoskeletal: Positive for back pain and joint pain. Negative for myalgias. Skin: Negative for rash. Neurological: Negative for weakness. Vitals:    07/30/19 0910   BP: 120/78   Pulse: 78   Resp: 12   Temp: 98.3 °F (36.8 °C)   SpO2: 98%   Height: 5' 10\" (1.778 m)   PainSc:   3   PainLoc: Back       Physical Exam   Constitutional: He is oriented to person, place, and time and well-developed, well-nourished, and in no distress. Neck: Normal range of motion. Neck supple. No thyromegaly present. Cardiovascular: Normal rate, regular rhythm and normal heart sounds. Pulmonary/Chest: Effort normal and breath sounds normal.   Musculoskeletal: Normal range of motion. Neurological: He is alert and oriented to person, place, and time. Gait normal.   Skin: Skin is warm and dry. Psychiatric: Mood, memory, affect and judgment normal.   Nursing note and vitals reviewed. Assessment/Plan      ICD-10-CM ICD-9-CM    1. Chronic pain of right knee M25.561 719.46 diclofenac EC (VOLTAREN) 50 mg EC tablet    G89.29 338.29    2. Bilateral low back pain with sciatica, sciatica laterality unspecified, unspecified chronicity M54.40 724.3 diclofenac EC (VOLTAREN) 50 mg EC tablet     He will make optometrist appt    I have discussed the diagnosis with the patient and the intended plan of care as seen in the above orders. The patient has received an after-visit summary and questions were answered concerning future plans. I have discussed medication, side effects, and warnings with the patient in detail. The patient verbalized understanding and is in agreement with the plan of care. The patient will contact the office with any additional concerns.     lab results and schedule of future lab studies reviewed with patient    Nick Ashford MD

## 2019-07-30 NOTE — PROGRESS NOTES
Pt in office to discuss disability/SSI. And also c/o knee and back pain. Pt also needs referral for eye exam.    Pt would like to see about possible dx Asperger's. 1. Have you been to the ER, urgent care clinic since your last visit? Hospitalized since your last visit? No    2. Have you seen or consulted any other health care providers outside of the [address] [place] [address] since your last visit? Include any pap smears or colon screening.  No

## 2019-07-30 NOTE — PATIENT INSTRUCTIONS
Back Pain: Care Instructions  Your Care Instructions    Back pain has many possible causes. It is often related to problems with muscles and ligaments of the back. It may also be related to problems with the nerves, discs, or bones of the back. Moving, lifting, standing, sitting, or sleeping in an awkward way can strain the back. Sometimes you don't notice the injury until later. Arthritis is another common cause of back pain. Although it may hurt a lot, back pain usually improves on its own within several weeks. Most people recover in 12 weeks or less. Using good home treatment and being careful not to stress your back can help you feel better sooner. Follow-up care is a key part of your treatment and safety. Be sure to make and go to all appointments, and call your doctor if you are having problems. It's also a good idea to know your test results and keep a list of the medicines you take. How can you care for yourself at home? · Sit or lie in positions that are most comfortable and reduce your pain. Try one of these positions when you lie down:  ? Lie on your back with your knees bent and supported by large pillows. ? Lie on the floor with your legs on the seat of a sofa or chair. ? Lie on your side with your knees and hips bent and a pillow between your legs. ? Lie on your stomach if it does not make pain worse. · Do not sit up in bed, and avoid soft couches and twisted positions. Bed rest can help relieve pain at first, but it delays healing. Avoid bed rest after the first day of back pain. · Change positions every 30 minutes. If you must sit for long periods of time, take breaks from sitting. Get up and walk around, or lie in a comfortable position. · Try using a heating pad on a low or medium setting for 15 to 20 minutes every 2 or 3 hours. Try a warm shower in place of one session with the heating pad. · You can also try an ice pack for 10 to 15 minutes every 2 to 3 hours.  Put a thin cloth between the ice pack and your skin. · Take pain medicines exactly as directed. ? If the doctor gave you a prescription medicine for pain, take it as prescribed. ? If you are not taking a prescription pain medicine, ask your doctor if you can take an over-the-counter medicine. · Take short walks several times a day. You can start with 5 to 10 minutes, 3 or 4 times a day, and work up to longer walks. Walk on level surfaces and avoid hills and stairs until your back is better. · Return to work and other activities as soon as you can. Continued rest without activity is usually not good for your back. · To prevent future back pain, do exercises to stretch and strengthen your back and stomach. Learn how to use good posture, safe lifting techniques, and proper body mechanics. When should you call for help? Call your doctor now or seek immediate medical care if:    · You have new or worsening numbness in your legs.     · You have new or worsening weakness in your legs. (This could make it hard to stand up.)     · You lose control of your bladder or bowels.    Watch closely for changes in your health, and be sure to contact your doctor if:    · You have a fever, lose weight, or don't feel well.     · You do not get better as expected. Where can you learn more? Go to http://june-adonis.info/. Enter Q088 in the search box to learn more about \"Back Pain: Care Instructions. \"  Current as of: September 20, 2018  Content Version: 12.1  © 7309-5567 [employer]. Care instructions adapted under license by [employer] (which disclaims liability or warranty for this information). If you have questions about a medical condition or this instruction, always ask your healthcare professional. [place] [address] any warranty or liability for your use of this information.

## 2019-08-19 ENCOUNTER — OFFICE VISIT (OUTPATIENT)
Dept: FAMILY MEDICINE CLINIC | Age: 34
End: 2019-08-19

## 2019-08-19 VITALS
SYSTOLIC BLOOD PRESSURE: 146 MMHG | HEART RATE: 94 BPM | BODY MASS INDEX: 31.92 KG/M2 | RESPIRATION RATE: 14 BRPM | HEIGHT: 70 IN | OXYGEN SATURATION: 99 % | TEMPERATURE: 98.3 F | DIASTOLIC BLOOD PRESSURE: 86 MMHG | WEIGHT: 223 LBS

## 2019-08-19 DIAGNOSIS — G89.29 CHRONIC PAIN OF RIGHT KNEE: Primary | ICD-10-CM

## 2019-08-19 DIAGNOSIS — M25.561 CHRONIC PAIN OF RIGHT KNEE: Primary | ICD-10-CM

## 2019-08-19 NOTE — PATIENT INSTRUCTIONS
Joint Pain: Care Instructions  Your Care Instructions    Many people have small aches and pains from overuse or injury to muscles and joints. Joint injuries often happen during sports or recreation, work tasks, or projects around the home. An overuse injury can happen when you put too much stress on a joint or when you do an activity that stresses the joint over and over, such as using the computer or rowing a boat. You can take action at home to help your muscles and joints get better. You should feel better in 1 to 2 weeks, but it can take 3 months or more to heal completely. Follow-up care is a key part of your treatment and safety. Be sure to make and go to all appointments, and call your doctor if you are having problems. It's also a good idea to know your test results and keep a list of the medicines you take. How can you care for yourself at home? · Do not put weight on the injured joint for at least a day or two. · For the first day or two after an injury, do not take hot showers or baths, and do not use hot packs. The heat could make swelling worse. · Put ice or a cold pack on the sore joint for 10 to 20 minutes at a time. Try to do this every 1 to 2 hours for the next 3 days (when you are awake) or until the swelling goes down. Put a thin cloth between the ice and your skin. · Wrap the injury in an elastic bandage. Do not wrap it too tightly because this can cause more swelling. · Prop up the sore joint on a pillow when you ice it or anytime you sit or lie down during the next 3 days. Try to keep it above the level of your heart. This will help reduce swelling. · Take an over-the-counter pain medicine, such as acetaminophen (Tylenol), ibuprofen (Advil, Motrin), or naproxen (Aleve). Read and follow all instructions on the label. · After 1 or 2 days of rest, begin moving the joint gently.  While the joint is still healing, you can begin to exercise using activities that do not strain or hurt the painful joint. When should you call for help? Call your doctor now or seek immediate medical care if:    · You have signs of infection, such as:  ? Increased pain, swelling, warmth, and redness. ? Red streaks leading from the joint. ? A fever.    Watch closely for changes in your health, and be sure to contact your doctor if:    · Your movement or symptoms are not getting better after 1 to 2 weeks of home treatment. Where can you learn more? Go to http://june-adonis.info/. Enter P205 in the search box to learn more about \"Joint Pain: Care Instructions. \"  Current as of: September 20, 2018  Content Version: 12.1  © 7042-5681 [employer]. Care instructions adapted under license by [employer] (which disclaims liability or warranty for this information). If you have questions about a medical condition or this instruction, always ask your healthcare professional. [address] any warranty or liability for your use of this information.

## 2019-08-19 NOTE — PROGRESS NOTES
Chief Complaint   Patient presents with    Knee Pain     right    pt states that his pain isn't getting any better. States that the Diclofenac. 1. Have you been to the ER, urgent care clinic since your last visit? Hospitalized since your last visit? No    2. Have you seen or consulted any other health care providers outside of the [address] [place] [address] since your last visit? Include any pap smears or colon screening.  No

## 2019-08-19 NOTE — PROGRESS NOTES
Lynnette Shrestha is a 29 y.o. male  presents for follow up. No new complaints. Has intermittent joint pain. Allergies   Allergen Reactions    Shellfish Derived Anaphylaxis    Sulfa (Sulfonamide Antibiotics) Hives and Rash    Ceclor [Cefaclor] Unknown (comments)    Erythromycin Unknown (comments)    Other Plant, Animal, Environmental Rash and Itching     Seasonal allergies to 10 types of trees     Outpatient Medications Marked as Taking for the 8/19/19 encounter (Office Visit) with Hugo Garvin MD   Medication Sig Dispense Refill    diclofenac EC (VOLTAREN) 50 mg EC tablet Take 1 Tab by mouth two (2) times a day. 40 Tab 1    escitalopram oxalate (LEXAPRO) 10 mg tablet TAKE ONE TABLET BY MOUTH DAILY 30 Tab 0     Patient Active Problem List   Diagnosis Code    Pollen allergies J30.1    Scoliosis M41.9    Depression F32.9    Other hyperlipidemia E78.49    Abnormal thyroid blood test R79.89    Non-seasonal allergic rhinitis due to pollen J30.1     Past Medical History:   Diagnosis Date    Chronic ear infection     Knee pain     right knee pain     Scoliosis     curvature of spine     Social History     Socioeconomic History    Marital status: LEGALLY      Spouse name: Not on file    Number of children: Not on file    Years of education: Not on file    Highest education level: Not on file   Tobacco Use    Smoking status: Never Smoker    Smokeless tobacco: Never Used   Substance and Sexual Activity    Alcohol use: Yes     Alcohol/week: 0.0 - 0.8 standard drinks    Drug use: No     Family History   Problem Relation Age of Onset    Hypertension Mother     Seizures Mother     Seizures Brother     Hypertension Maternal Grandmother     Cancer Maternal Grandfather         Review of Systems   Constitutional: Negative for chills, fever, malaise/fatigue and weight loss. Eyes: Negative for blurred vision. Respiratory: Negative for shortness of breath and wheezing. Cardiovascular: Negative for chest pain. Gastrointestinal: Negative for nausea and vomiting. Musculoskeletal: Positive for joint pain. Skin: Negative for rash. Neurological: Negative for weakness. Vitals:    08/19/19 1433   BP: 146/86   Pulse: 94   Resp: 14   Temp: 98.3 °F (36.8 °C)   SpO2: 99%   Weight: 223 lb (101.2 kg)   Height: 5' 10\" (1.778 m)   PainSc:   8   PainLoc: Knee       Physical Exam   Constitutional: He is oriented to person, place, and time and well-developed, well-nourished, and in no distress. Neck: Normal range of motion. Neck supple. No thyromegaly present. Cardiovascular: Normal rate, regular rhythm and normal heart sounds. Pulmonary/Chest: Effort normal and breath sounds normal.   Musculoskeletal: Normal range of motion. Neurological: He is alert and oriented to person, place, and time. Skin: Skin is warm and dry. Psychiatric: Mood, memory, affect and judgment normal.   Nursing note and vitals reviewed. Assessment/Plan      ICD-10-CM ICD-9-CM    1. Chronic pain of right knee M25.561 719.46 REFERRAL TO ORTHOPEDIC SURGERY    G89.29 338.29      Follow up with ortho. I have discussed the diagnosis with the patient and the intended plan of care as seen in the above orders. The patient has received an after-visit summary and questions were answered concerning future plans. I have discussed medication, side effects, and warnings with the patient in detail. The patient verbalized understanding and is in agreement with the plan of care. The patient will contact the office with any additional concerns.     lab results and schedule of future lab studies reviewed with patient    Susie Waller MD

## 2019-08-21 ENCOUNTER — TELEPHONE (OUTPATIENT)
Dept: FAMILY MEDICINE CLINIC | Age: 34
End: 2019-08-21

## 2019-08-21 DIAGNOSIS — M25.561 RIGHT KNEE PAIN, UNSPECIFIED CHRONICITY: Primary | ICD-10-CM

## 2019-08-22 NOTE — TELEPHONE ENCOUNTER
Patient's mother is asking for referral for right knee pain, order has been pended please review and sign if appropriate.

## 2019-09-13 ENCOUNTER — OFFICE VISIT (OUTPATIENT)
Dept: ORTHOPEDIC SURGERY | Age: 34
End: 2019-09-13

## 2019-09-13 VITALS
OXYGEN SATURATION: 97 % | HEIGHT: 70 IN | SYSTOLIC BLOOD PRESSURE: 119 MMHG | WEIGHT: 232 LBS | BODY MASS INDEX: 33.21 KG/M2 | DIASTOLIC BLOOD PRESSURE: 74 MMHG | TEMPERATURE: 97.3 F | HEART RATE: 59 BPM

## 2019-09-13 DIAGNOSIS — M25.561 RIGHT KNEE PAIN, UNSPECIFIED CHRONICITY: Primary | ICD-10-CM

## 2019-09-13 DIAGNOSIS — M17.11 PATELLOFEMORAL ARTHRITIS OF RIGHT KNEE: ICD-10-CM

## 2019-09-13 RX ORDER — MELOXICAM 15 MG/1
15 TABLET ORAL DAILY
Qty: 30 TAB | Refills: 2 | Status: SHIPPED | OUTPATIENT
Start: 2019-09-13 | End: 2019-11-19 | Stop reason: SDUPTHER

## 2019-09-13 NOTE — PROGRESS NOTES
Patient: Cinthia Long                MRN: [id number]       SSN: [id number]  YOB: 1985        AGE: 36 y.o. SEX: male  Body mass index is 33.29 kg/m². PCP: Radha Khan MD  09/13/19  Dear Dr. Jaimes Memory:    HISTORY:  Thank you very much for having me see Bernabe Orlando today. Bernabe Orlando is having right knee problems on a daily basis. He is getting catching, locking, giving way; in fact, the knee caught on him in the office today. He has had a surgery before where it looks like it is regarding the patella or quadriceps mechanism. It is a superior incision. He gets locking, catching, giving way, some difficulties with stairs and kneeling. Denies fevers, chills, night sweats or weight loss and otherwise has been feeling well. He has arthritis in other areas. Cortisone really does not work very well for him. It only gives him a couple weeks of relief. PHYSICAL EXAMINATION:  Examination today, he has positive movie theater sign when he first arises from the chair. Hips rotate adequately. He has a positive Forrest test, joint line tenderness. His ACL is 1 plus. His quads seem to be intact. He has good motion. Slight swelling. There is no evidence for infection or DVT seen today. REVIEW OF SYSTEMS:  Positive for arthritis and other joints as well. RADIOGRAPHS:  I reviewed his x-rays, AP, lateral, tunnel and skyline. They confirm mild arthritis in the main weight-bearing space proper and some mild degenerative changes involving patellofemoral articulation. He does have a positive J sign on exam as well and some mild attenuation of the VMOs. I actually observed him having an episode of giving way owing to the medial side. I am very suspicious for medial meniscus tear. As such, MRI is indicated. He has already been on anti-inflammatories. We are going to switch him to Mobic, which is just once a day and we did have the usual precaution discussion.     cc:  _______________ ?? REVIEW OF SYSTEMS:      CON: negative for weight loss, fever  EYE: negative for double vision  ENT: negative for hoarseness  RS:   negative for Tb  GI:    negative for blood in stool  :  negative for blood in urine  Other systems reviewed and noted below. Past Medical History:   Diagnosis Date    Chronic ear infection     Knee pain     right knee pain     Scoliosis     curvature of spine       Family History   Problem Relation Age of Onset    Hypertension Mother     Seizures Mother     Seizures Brother     Hypertension Maternal Grandmother     Cancer Maternal Grandfather        Current Outpatient Medications   Medication Sig Dispense Refill    diclofenac EC (VOLTAREN) 50 mg EC tablet Take 1 Tab by mouth two (2) times a day. 40 Tab 1    escitalopram oxalate (LEXAPRO) 10 mg tablet TAKE ONE TABLET BY MOUTH DAILY 30 Tab 0       Allergies   Allergen Reactions    Shellfish Derived Anaphylaxis    Sulfa (Sulfonamide Antibiotics) Hives and Rash    Ceclor [Cefaclor] Unknown (comments)    Erythromycin Unknown (comments)    Other Plant, Animal, Environmental Rash and Itching     Seasonal allergies to 10 types of trees       Past Surgical History:   Procedure Laterality Date    HX CYST REMOVAL      on foot     HX HEENT      Tubes in ears as child    HX ORTHOPAEDIC  2001    right knee       Social History     Socioeconomic History    Marital status: LEGALLY      Spouse name: Not on file    Number of children: Not on file    Years of education: Not on file    Highest education level: Not on file   Occupational History    Not on file   Social Needs    Financial resource strain: Not on file    Food insecurity:     Worry: Not on file     Inability: Not on file    Transportation needs:     Medical: Not on file     Non-medical: Not on file   Tobacco Use    Smoking status: Never Smoker    Smokeless tobacco: Never Used   Substance and Sexual Activity    Alcohol use:  Yes Alcohol/week: 0.0 - 0.8 standard drinks    Drug use: No    Sexual activity: Not on file   Lifestyle    Physical activity:     Days per week: Not on file     Minutes per session: Not on file    Stress: Not on file   Relationships    Social connections:     Talks on phone: Not on file     Gets together: Not on file     Attends Sabianism service: Not on file     Active member of club or organization: Not on file     Attends meetings of clubs or organizations: Not on file     Relationship status: Not on file    Intimate partner violence:     Fear of current or ex partner: Not on file     Emotionally abused: Not on file     Physically abused: Not on file     Forced sexual activity: Not on file   Other Topics Concern    Not on file   Social History Narrative    Not on file       Visit Vitals  /74   Pulse (!) 59   Temp 97.3 °F (36.3 °C) (Oral)   Ht 5' 10\" (1.778 m)   Wt 232 lb (105.2 kg)   SpO2 97%   BMI 33.29 kg/m²         PHYSICAL EXAMINATION:  GENERAL: Alert and oriented x3, in no acute distress, well-developed, well-nourished, afebrile. HEART: No JVD. EYES: No scleral icterus   NECK: No significant lymphadenopathy   LUNGS: No respiratory compromise or indrawing  ABDOMEN: Soft, non-tender, non-distended. Electronically signed by:  Yajaira Israel MD

## 2019-09-24 ENCOUNTER — HOSPITAL ENCOUNTER (OUTPATIENT)
Dept: MRI IMAGING | Age: 34
Discharge: HOME | End: 2019-09-24
Attending: ORTHOPAEDIC SURGERY
Payer: MEDICAID

## 2019-09-24 DIAGNOSIS — M25.561 RIGHT KNEE PAIN, UNSPECIFIED CHRONICITY: ICD-10-CM

## 2019-09-24 PROCEDURE — 73721 MRI JNT OF LWR EXTRE W/O DYE: CPT

## 2019-10-04 ENCOUNTER — OFFICE VISIT (OUTPATIENT)
Dept: ORTHOPEDIC SURGERY | Age: 34
End: 2019-10-04

## 2019-10-04 VITALS
WEIGHT: 239.2 LBS | HEART RATE: 60 BPM | BODY MASS INDEX: 34.24 KG/M2 | DIASTOLIC BLOOD PRESSURE: 69 MMHG | SYSTOLIC BLOOD PRESSURE: 118 MMHG | RESPIRATION RATE: 16 BRPM | HEIGHT: 70 IN | OXYGEN SATURATION: 96 % | TEMPERATURE: 97.4 F

## 2019-10-04 DIAGNOSIS — M22.40 CHONDROMALACIA PATELLAE, UNSPECIFIED LATERALITY: ICD-10-CM

## 2019-10-04 DIAGNOSIS — M17.9 OSTEOARTHRITIS OF KNEE, UNSPECIFIED LATERALITY, UNSPECIFIED OSTEOARTHRITIS TYPE: Primary | ICD-10-CM

## 2019-10-04 RX ORDER — NAPROXEN SODIUM 220 MG
220 TABLET ORAL 2 TIMES DAILY WITH MEALS
COMMUNITY
End: 2019-11-19 | Stop reason: ALTCHOICE

## 2019-10-04 NOTE — PROGRESS NOTES
1. Have you been to the ER, urgent care clinic since your last visit? Hospitalized since your last visit? No    2. Have you seen or consulted any other health care providers outside of the [address] [place] [address] since your last visit? Include any pap smears or colon screening.  No

## 2019-10-04 NOTE — PROGRESS NOTES
Patient: James Shields                MRN: [id number]       SSN: [id number]  YOB: 1985        AGE: 36 y.o. SEX: male  Body mass index is 34.32 kg/m². PCP: Crissy Steel MD  10/04/19    HISTORY:  I had the pleasure of reviewing the patient for follow-up on right knee pain. The knee pain has improved a little bit. He alternates between ibuprofen and Aleve. Some days are better than others. He is having some episodes of catching, locking, giving way. He has difficulties with stairs and kneeling. Review of systems is negative for fevers, chills, night sweats or weight loss. Today is actually a better day for him. A 12-point review of systems performed today. All pertinent positives documented. All other systems are otherwise negative. PHYSICAL EXAMINATION:  On examination today, he is a pleasant enough gentleman. He has a touch of a movie theater sign. His previous incision is well healed. Negligible effusion in the knee. Forrest's test is just equivocal.  He has 1+ ACL. Quads are intact. He bends well. He does have a mildly positive J sign and some VMO attenuation. RADIOGRAPHS:   I did review his x-rays, there are just mild degenerative changes. IMAGING:  MRI is negative for meniscal tear. He does have some patellar abnormalities as previously elucidated. PLAN:  I would recommend some physical therapy for him. He really does not want to do therapy. I have instructed him on some home exercises. We will see him back in follow-up. In about 6 months' time we can repeat the plain x-rays or sooner if there is any problem whatsoever. It has been a pleasure to share in his care and provide opinion and advice in regard to his management.       CC:  Carmen Dubon MD         REVIEW OF SYSTEMS:      CON: negative for weight loss, fever  EYE: negative for double vision  ENT: negative for hoarseness  RS:   negative for Tb  GI:    negative for blood in stool  :  negative for blood in urine  Other systems reviewed and noted below. Past Medical History:   Diagnosis Date    Chronic ear infection     Knee pain     right knee pain     Scoliosis     curvature of spine       Family History   Problem Relation Age of Onset    Hypertension Mother     Seizures Mother     Seizures Brother     Hypertension Maternal Grandmother     Cancer Maternal Grandfather        Current Outpatient Medications   Medication Sig Dispense Refill    naproxen sodium (ALEVE) 220 mg tablet Take 220 mg by mouth two (2) times daily (with meals).  meloxicam (MOBIC) 15 mg tablet Take 1 Tab by mouth daily. 30 Tab 2    escitalopram oxalate (LEXAPRO) 10 mg tablet TAKE ONE TABLET BY MOUTH DAILY 30 Tab 0       Allergies   Allergen Reactions    Shellfish Derived Anaphylaxis    Sulfa (Sulfonamide Antibiotics) Hives and Rash    Ceclor [Cefaclor] Unknown (comments)    Erythromycin Unknown (comments)    Other Plant, Animal, Environmental Rash and Itching     Seasonal allergies to 10 types of trees       Past Surgical History:   Procedure Laterality Date    HX CYST REMOVAL      on foot     HX HEENT      Tubes in ears as child    HX ORTHOPAEDIC  2001    right knee       Social History     Socioeconomic History    Marital status: LEGALLY      Spouse name: Not on file    Number of children: Not on file    Years of education: Not on file    Highest education level: Not on file   Occupational History    Not on file   Social Needs    Financial resource strain: Not on file    Food insecurity:     Worry: Not on file     Inability: Not on file    Transportation needs:     Medical: Not on file     Non-medical: Not on file   Tobacco Use    Smoking status: Never Smoker    Smokeless tobacco: Never Used   Substance and Sexual Activity    Alcohol use:  Yes     Alcohol/week: 0.0 - 0.8 standard drinks    Drug use: No    Sexual activity: Not on file   Lifestyle    Physical activity:     Days per week: Not on file     Minutes per session: Not on file    Stress: Not on file   Relationships    Social connections:     Talks on phone: Not on file     Gets together: Not on file     Attends Oriental orthodox service: Not on file     Active member of club or organization: Not on file     Attends meetings of clubs or organizations: Not on file     Relationship status: Not on file    Intimate partner violence:     Fear of current or ex partner: Not on file     Emotionally abused: Not on file     Physically abused: Not on file     Forced sexual activity: Not on file   Other Topics Concern    Not on file   Social History Narrative    Not on file       Visit Vitals  /69   Pulse 60   Temp 97.4 °F (36.3 °C) (Oral)   Resp 16   Ht 5' 10\" (1.778 m)   Wt 239 lb 3.2 oz (108.5 kg)   SpO2 96%   BMI 34.32 kg/m²         PHYSICAL EXAMINATION:  GENERAL: Alert and oriented x3, in no acute distress, well-developed, well-nourished, afebrile. HEART: No JVD. EYES: No scleral icterus   NECK: No significant lymphadenopathy   LUNGS: No respiratory compromise or indrawing  ABDOMEN: Soft, non-tender, non-distended. Electronically signed by:  Mike Blake MD

## 2019-11-19 ENCOUNTER — OFFICE VISIT (OUTPATIENT)
Dept: FAMILY MEDICINE CLINIC | Age: 34
End: 2019-11-19

## 2019-11-19 VITALS
WEIGHT: 239 LBS | RESPIRATION RATE: 16 BRPM | HEIGHT: 70 IN | TEMPERATURE: 98.1 F | SYSTOLIC BLOOD PRESSURE: 122 MMHG | HEART RATE: 66 BPM | BODY MASS INDEX: 34.22 KG/M2 | OXYGEN SATURATION: 96 % | DIASTOLIC BLOOD PRESSURE: 78 MMHG

## 2019-11-19 DIAGNOSIS — J01.20 SUBACUTE ETHMOIDAL SINUSITIS: ICD-10-CM

## 2019-11-19 DIAGNOSIS — M17.11 PATELLOFEMORAL ARTHRITIS OF RIGHT KNEE: Primary | ICD-10-CM

## 2019-11-19 RX ORDER — CIPROFLOXACIN 500 MG/1
500 TABLET ORAL 2 TIMES DAILY
Qty: 20 TAB | Refills: 0 | Status: SHIPPED | OUTPATIENT
Start: 2019-11-19 | End: 2019-11-29

## 2019-11-19 RX ORDER — MELOXICAM 15 MG/1
15 TABLET ORAL DAILY
Qty: 30 TAB | Refills: 2 | Status: SHIPPED | OUTPATIENT
Start: 2019-11-19 | End: 2021-09-10 | Stop reason: ALTCHOICE

## 2019-11-19 NOTE — PROGRESS NOTES
Kendra Dumont presents today for   Chief Complaint   Patient presents with    Joint Pain       Coordination of Care:  1. Have you been to the ER, urgent care clinic since your last visit? 9/9/19 [facility] for Panic Attack  Hospitalized since your last visit? no    2. Have you seen or consulted any other health care providers outside of the [address] [place] [address] since your last visit? Include any pap smears or colon screening.  no

## 2019-11-19 NOTE — PROGRESS NOTES
Shanita Saravia is a 36 y.o. male  presents for follow up of knee pain. He also has sinus congestion. He has had it for several days. sxs are getting worse. Has tried otc meds but it has not helped    Allergies   Allergen Reactions    Shellfish Derived Anaphylaxis    Sulfa (Sulfonamide Antibiotics) Hives and Rash    Ceclor [Cefaclor] Unknown (comments)    Erythromycin Unknown (comments)    Other Plant, Animal, Environmental Rash and Itching     Seasonal allergies to 10 types of trees     Outpatient Medications Marked as Taking for the 11/19/19 encounter (Office Visit) with Roseann Angulo MD   Medication Sig Dispense Refill    naproxen sodium (ALEVE) 220 mg tablet Take 220 mg by mouth two (2) times daily (with meals).  meloxicam (MOBIC) 15 mg tablet Take 1 Tab by mouth daily. 30 Tab 2    escitalopram oxalate (LEXAPRO) 10 mg tablet TAKE ONE TABLET BY MOUTH DAILY 30 Tab 0     Patient Active Problem List   Diagnosis Code    Pollen allergies Z91.09    Scoliosis M41.9    Depression F32.9    Other hyperlipidemia E78.49    Abnormal thyroid blood test R79.89    Non-seasonal allergic rhinitis due to pollen J30.1     Past Medical History:   Diagnosis Date    Chronic ear infection     Knee pain     right knee pain     Scoliosis     curvature of spine     Social History     Socioeconomic History    Marital status: LEGALLY      Spouse name: Not on file    Number of children: Not on file    Years of education: Not on file    Highest education level: Not on file   Tobacco Use    Smoking status: Never Smoker    Smokeless tobacco: Never Used   Substance and Sexual Activity    Alcohol use:  Yes     Alcohol/week: 0.0 - 0.8 standard drinks    Drug use: No     Family History   Problem Relation Age of Onset    Hypertension Mother     Seizures Mother     Seizures Brother     Hypertension Maternal Grandmother     Cancer Maternal Grandfather         Review of Systems   Constitutional: Negative for chills, fever, malaise/fatigue and weight loss. HENT: Positive for congestion and sinus pain. Eyes: Negative for blurred vision. Respiratory: Positive for cough. Negative for shortness of breath and wheezing. Cardiovascular: Negative for chest pain. Gastrointestinal: Negative for nausea and vomiting. Musculoskeletal: Positive for joint pain (knees). Negative for myalgias. Skin: Negative for rash. Neurological: Negative for weakness. Vitals:    11/19/19 0820   BP: 122/78   Pulse: 66   Resp: 16   Temp: 98.1 °F (36.7 °C)   TempSrc: Oral   SpO2: 96%   Weight: 239 lb (108.4 kg)   Height: 5' 10\" (1.778 m)   PainSc:   0 - No pain       Physical Exam   Constitutional: He is oriented to person, place, and time and well-developed, well-nourished, and in no distress. HENT:   Right Ear: External ear normal.   Left Ear: External ear normal.   Nose: Nose normal.   Mouth/Throat: Oropharynx is clear and moist.   Eyes: Pupils are equal, round, and reactive to light. Conjunctivae and EOM are normal.   Neck: Normal range of motion. Neck supple. No thyromegaly present. Cardiovascular: Normal rate, regular rhythm and normal heart sounds. Pulmonary/Chest: Effort normal and breath sounds normal.   Musculoskeletal: Normal range of motion. Neurological: He is alert and oriented to person, place, and time. Gait normal.   Skin: Skin is warm and dry. Nursing note and vitals reviewed. Assessment/Plan      ICD-10-CM ICD-9-CM    1. Patellofemoral arthritis of right knee M17.11 716.96 meloxicam (MOBIC) 15 mg tablet   2. Subacute ethmoidal sinusitis J01.20 461.2 ciprofloxacin HCl (CIPRO) 500 mg tablet     I have discussed the diagnosis with the patient and the intended plan of care as seen in the above orders. The patient has received an after-visit summary and questions were answered concerning future plans. I have discussed medication, side effects, and warnings with the patient in detail.  The patient verbalized understanding and is in agreement with the plan of care. The patient will contact the office with any additional concerns.     lab results and schedule of future lab studies reviewed with patient    Amaris Brown MD

## 2019-11-19 NOTE — PATIENT INSTRUCTIONS
Arthritis: Care Instructions  Your Care Instructions  Arthritis, also called osteoarthritis, is a breakdown of the cartilage that cushions your joints. When the cartilage wears down, your bones rub against each other. This causes pain and stiffness. Many people have some arthritis as they age. Arthritis most often affects the joints of the spine, hands, hips, knees, or feet. You can take simple measures to protect your joints, ease your pain, and help you stay active. Follow-up care is a key part of your treatment and safety. Be sure to make and go to all appointments, and call your doctor if you are having problems. It's also a good idea to know your test results and keep a list of the medicines you take. How can you care for yourself at home? · Stay at a healthy weight. Being overweight puts extra strain on your joints. · Talk to your doctor or physical therapist about exercises that will help ease joint pain. ? Stretch. You may enjoy gentle forms of yoga to help keep your joints and muscles flexible. ? Walk instead of jog. Other types of exercise that are less stressful on the joints include riding a bicycle, swimming, jessica chi, or water exercise. ? Lift weights. Strong muscles help reduce stress on your joints. Stronger thigh muscles, for example, take some of the stress off of the knees and hips. Learn the right way to lift weights so you do not make joint pain worse. · Take your medicines exactly as prescribed. Call your doctor if you think you are having a problem with your medicine. · Take pain medicines exactly as directed. ? If the doctor gave you a prescription medicine for pain, take it as prescribed. ? If you are not taking a prescription pain medicine, ask your doctor if you can take an over-the-counter medicine. · Use a cane, crutch, walker, or another device if you need help to get around. These can help rest your joints.  You also can use other things to make life easier, such as a higher toilet seat and padded handles on kitchen utensils. · Do not sit in low chairs, which can make it hard to get up. · Put heat or cold on your sore joints as needed. Use whichever helps you most. You also can take turns with hot and cold packs. ? Apply heat 2 or 3 times a day for 20 to 30 minutes--using a heating pad, hot shower, or hot pack--to relieve pain and stiffness. ? Put ice or a cold pack on your sore joint for 10 to 20 minutes at a time. Put a thin cloth between the ice and your skin. When should you call for help? Call your doctor now or seek immediate medical care if:    · You have sudden swelling, warmth, or pain in any joint.     · You have joint pain and a fever or rash.     · You have such bad pain that you cannot use a joint.    Watch closely for changes in your health, and be sure to contact your doctor if:    · You have mild joint symptoms that continue even with more than 6 weeks of care at home.     · You have stomach pain or other problems with your medicine. Where can you learn more? Go to http://june-adonis.info/. Enter R754 in the search box to learn more about \"Arthritis: Care Instructions. \"  Current as of: April 1, 2019  Content Version: 12.2  © 0790-3780 [employer]. Care instructions adapted under license by [employer] (which disclaims liability or warranty for this information). If you have questions about a medical condition or this instruction, always ask your healthcare professional. [address] any warranty or liability for your use of this information.

## 2019-11-29 ENCOUNTER — TELEPHONE (OUTPATIENT)
Dept: FAMILY MEDICINE CLINIC | Age: 34
End: 2019-11-29

## 2019-11-29 NOTE — TELEPHONE ENCOUNTER
Spoke with patients mother who states patient needs to be evaluated for Asperger's syndrome. Please advise.

## 2019-12-02 DIAGNOSIS — F41.9 ANXIETY: Primary | ICD-10-CM

## 2019-12-02 NOTE — TELEPHONE ENCOUNTER
Mr. Corinna Harding called this morning requesting a referral for a psychiatrist within [facility]'s network. He claims he really needs to see one soon. Also, he is wondering if the lexapro and mobic are counteracting each other. He states this combination makes him very groggy and he is tired of that feeling. He stopped talking the second one, and he already notices a difference with energy. He would like to know what Dr. Brett Newman could do to shake the fatigue feelings. Please return call to his mobile [phone].

## 2019-12-03 NOTE — TELEPHONE ENCOUNTER
Mr. Lam Se calling again to check the status of his phone request yesterday. Informed Anali Genaro Miller there is a 48-72 turn around time.

## 2019-12-05 NOTE — TELEPHONE ENCOUNTER
Called Ms. Giselapatricia Toney no answer left message letting her know Dr. John Mullins has out in a referral to psychology for patient to be evaluated for Asperger's. She has been told someone will contact them to set up an appointment and asked to contact the office with any further concerns.

## 2020-02-11 ENCOUNTER — OFFICE VISIT (OUTPATIENT)
Dept: FAMILY MEDICINE CLINIC | Age: 35
End: 2020-02-11

## 2020-02-11 VITALS
OXYGEN SATURATION: 98 % | HEART RATE: 59 BPM | RESPIRATION RATE: 13 BRPM | WEIGHT: 238 LBS | TEMPERATURE: 98.3 F | DIASTOLIC BLOOD PRESSURE: 79 MMHG | HEIGHT: 70 IN | SYSTOLIC BLOOD PRESSURE: 128 MMHG | BODY MASS INDEX: 34.07 KG/M2

## 2020-02-11 DIAGNOSIS — M62.838 MUSCLE SPASM: ICD-10-CM

## 2020-02-11 DIAGNOSIS — J01.21 ACUTE RECURRENT ETHMOIDAL SINUSITIS: Primary | ICD-10-CM

## 2020-02-11 RX ORDER — CIPROFLOXACIN 500 MG/1
500 TABLET ORAL 2 TIMES DAILY
Qty: 20 TAB | Refills: 0 | Status: SHIPPED | OUTPATIENT
Start: 2020-02-11 | End: 2020-02-21

## 2020-02-11 RX ORDER — METHOCARBAMOL 500 MG/1
500 TABLET, FILM COATED ORAL 4 TIMES DAILY
Qty: 40 TAB | Refills: 1 | Status: SHIPPED | OUTPATIENT
Start: 2020-02-11

## 2020-02-11 NOTE — PROGRESS NOTES
Pt in office for sinus infection. 1. Have you been to the ER, urgent care clinic since your last visit? Hospitalized since your last visit? No    2. Have you seen or consulted any other health care providers outside of the [address] [place] [address] since your last visit? Include any pap smears or colon screening.  No

## 2020-02-11 NOTE — PATIENT INSTRUCTIONS
Sinusitis: Care Instructions  Your Care Instructions    Sinusitis is an infection of the lining of the sinus cavities in your head. Sinusitis often follows a cold. It causes pain and pressure in your head and face. In most cases, sinusitis gets better on its own in 1 to 2 weeks. But some mild symptoms may last for several weeks. Sometimes antibiotics are needed. Follow-up care is a key part of your treatment and safety. Be sure to make and go to all appointments, and call your doctor if you are having problems. It's also a good idea to know your test results and keep a list of the medicines you take. How can you care for yourself at home? · Take an over-the-counter pain medicine, such as acetaminophen (Tylenol), ibuprofen (Advil, Motrin), or naproxen (Aleve). Read and follow all instructions on the label. · If the doctor prescribed antibiotics, take them as directed. Do not stop taking them just because you feel better. You need to take the full course of antibiotics. · Be careful when taking over-the-counter cold or flu medicines and Tylenol at the same time. Many of these medicines have acetaminophen, which is Tylenol. Read the labels to make sure that you are not taking more than the recommended dose. Too much acetaminophen (Tylenol) can be harmful. · Breathe warm, moist air from a steamy shower, a hot bath, or a sink filled with hot water. Avoid cold, dry air. Using a humidifier in your home may help. Follow the directions for cleaning the machine. · Use saline (saltwater) nasal washes to help keep your nasal passages open and wash out mucus and bacteria. You can buy saline nose drops at a grocery store or drugstore. Or you can make your own at home by adding 1 teaspoon of salt and 1 teaspoon of baking soda to 2 cups of distilled water. If you make your own, fill a bulb syringe with the solution, insert the tip into your nostril, and squeeze gently. Cheikhilyn Ply your nose.   · Put a hot, wet towel or a warm gel pack on your face 3 or 4 times a day for 5 to 10 minutes each time. · Try a decongestant nasal spray like oxymetazoline (Afrin). Do not use it for more than 3 days in a row. Using it for more than 3 days can make your congestion worse. When should you call for help? Call your doctor now or seek immediate medical care if:    · You have new or worse swelling or redness in your face or around your eyes.     · You have a new or higher fever.    Watch closely for changes in your health, and be sure to contact your doctor if:    · You have new or worse facial pain.     · The mucus from your nose becomes thicker (like pus) or has new blood in it.     · You are not getting better as expected. Where can you learn more? Go to http://june-adonis.info/. Enter T625 in the search box to learn more about \"Sinusitis: Care Instructions. \"  Current as of: October 21, 2018  Content Version: 12.2  © 2174-7111 [employer]. Care instructions adapted under license by [employer] (which disclaims liability or warranty for this information). If you have questions about a medical condition or this instruction, always ask your healthcare professional. [place] [address] any warranty or liability for your use of this information.

## 2020-02-11 NOTE — PROGRESS NOTES
Dimas Rosenberg is a 36 y.o. male  presents for sinus congestion. He has had sxs for several days. sxs are getting worse. He has assoc sinus pain. No fever or chills. He also has muscle spasms that are worse at night. No injury or trauma. Allergies   Allergen Reactions    Shellfish Derived Anaphylaxis    Sulfa (Sulfonamide Antibiotics) Hives and Rash    Ceclor [Cefaclor] Unknown (comments)    Erythromycin Unknown (comments)    Other Plant, Animal, Environmental Rash and Itching     Seasonal allergies to 10 types of trees    Penicillins Itching     Outpatient Medications Marked as Taking for the 2/11/20 encounter (Office Visit) with Tanisha Dickson MD   Medication Sig Dispense Refill    meloxicam (MOBIC) 15 mg tablet Take 1 Tab by mouth daily. 30 Tab 2    escitalopram oxalate (LEXAPRO) 10 mg tablet TAKE ONE TABLET BY MOUTH DAILY 30 Tab 0     Patient Active Problem List   Diagnosis Code    Pollen allergies Z91.09    Scoliosis M41.9    Depression F32.9    Other hyperlipidemia E78.49    Abnormal thyroid blood test R79.89    Non-seasonal allergic rhinitis due to pollen J30.1     Past Medical History:   Diagnosis Date    Chronic ear infection     Knee pain     right knee pain     Scoliosis     curvature of spine     Social History     Socioeconomic History    Marital status: LEGALLY      Spouse name: Not on file    Number of children: Not on file    Years of education: Not on file    Highest education level: Not on file   Tobacco Use    Smoking status: Never Smoker    Smokeless tobacco: Never Used   Substance and Sexual Activity    Alcohol use:  Yes     Alcohol/week: 0.0 - 0.8 standard drinks    Drug use: No     Family History   Problem Relation Age of Onset    Hypertension Mother     Seizures Mother     Seizures Brother     Hypertension Maternal Grandmother     Cancer Maternal Grandfather         Review of Systems   Constitutional: Negative for chills, fever, malaise/fatigue and weight loss. HENT: Positive for congestion and sinus pain. Eyes: Negative for blurred vision. Respiratory: Positive for cough. Negative for shortness of breath and wheezing. Cardiovascular: Negative for chest pain. Gastrointestinal: Negative for nausea and vomiting. Musculoskeletal: Negative for myalgias. Skin: Negative for rash. Neurological: Negative for weakness. Vitals:    02/11/20 0938   BP: 128/79   Pulse: (!) 59   Resp: 13   Temp: 98.3 °F (36.8 °C)   SpO2: 98%   Weight: 238 lb (108 kg)   Height: 5' 10\" (1.778 m)   PainSc:   0 - No pain       Physical Exam  Vitals signs and nursing note reviewed. HENT:      Right Ear: Tympanic membrane normal.      Left Ear: Tympanic membrane normal.   Neck:      Musculoskeletal: Normal range of motion and neck supple. Thyroid: No thyromegaly. Cardiovascular:      Rate and Rhythm: Normal rate and regular rhythm. Heart sounds: Normal heart sounds. Pulmonary:      Effort: Pulmonary effort is normal.      Breath sounds: Normal breath sounds. Musculoskeletal: Normal range of motion. Skin:     General: Skin is warm and dry. Neurological:      Mental Status: He is alert and oriented to person, place, and time. Assessment/Plan      ICD-10-CM ICD-9-CM    1. Acute recurrent ethmoidal sinusitis J01.21 461.2 ciprofloxacin HCl (CIPRO) 500 mg tablet   2. Muscle spasm M62.838 728.85 methocarbamol (ROBAXIN) 500 mg tablet     I have discussed the diagnosis with the patient and the intended plan of care as seen in the above orders. The patient has received an after-visit summary and questions were answered concerning future plans. I have discussed medication, side effects, and warnings with the patient in detail. The patient verbalized understanding and is in agreement with the plan of care. The patient will contact the office with any additional concerns.     lab results and schedule of future lab studies reviewed with patient    Lambert Martin MD

## 2020-02-19 ENCOUNTER — HOSPITAL ENCOUNTER (OUTPATIENT)
Dept: LAB | Age: 35
Discharge: HOME | End: 2020-02-19
Payer: MEDICAID

## 2020-02-19 ENCOUNTER — OFFICE VISIT (OUTPATIENT)
Dept: FAMILY MEDICINE CLINIC | Age: 35
End: 2020-02-19

## 2020-02-19 VITALS — BODY MASS INDEX: 33.79 KG/M2 | WEIGHT: 236 LBS | RESPIRATION RATE: 13 BRPM | HEIGHT: 70 IN

## 2020-02-19 DIAGNOSIS — E78.49 OTHER HYPERLIPIDEMIA: Primary | ICD-10-CM

## 2020-02-19 DIAGNOSIS — F32.89 OTHER DEPRESSION: ICD-10-CM

## 2020-02-19 DIAGNOSIS — E78.49 OTHER HYPERLIPIDEMIA: ICD-10-CM

## 2020-02-19 LAB
ALBUMIN SERPL-MCNC: 4.2 G/DL (ref 3.4–5)
ALBUMIN/GLOB SERPL: 1.4 {RATIO} (ref 0.8–1.7)
ALP SERPL-CCNC: 87 U/L (ref 45–117)
ALT SERPL-CCNC: 61 U/L (ref 16–61)
ANION GAP SERPL CALC-SCNC: 8 MMOL/L (ref 3–18)
AST SERPL-CCNC: 27 U/L (ref 10–38)
BILIRUB SERPL-MCNC: 1.1 MG/DL (ref 0.2–1)
BUN SERPL-MCNC: 13 MG/DL (ref 7–18)
BUN/CREAT SERPL: 9 (ref 12–20)
CALCIUM SERPL-MCNC: 8.9 MG/DL (ref 8.5–10.1)
CHLORIDE SERPL-SCNC: 107 MMOL/L (ref 100–111)
CHOLEST SERPL-MCNC: 195 MG/DL
CO2 SERPL-SCNC: 27 MMOL/L (ref 21–32)
CREAT SERPL-MCNC: 1.41 MG/DL (ref 0.6–1.3)
GLOBULIN SER CALC-MCNC: 3.1 G/DL (ref 2–4)
GLUCOSE SERPL-MCNC: 67 MG/DL (ref 74–99)
HDLC SERPL-MCNC: 31 MG/DL (ref 40–60)
HDLC SERPL: 6.3 {RATIO} (ref 0–5)
LDLC SERPL CALC-MCNC: 128.8 MG/DL (ref 0–100)
LIPID PROFILE,FLP: ABNORMAL
POTASSIUM SERPL-SCNC: 4.4 MMOL/L (ref 3.5–5.5)
PROT SERPL-MCNC: 7.3 G/DL (ref 6.4–8.2)
SODIUM SERPL-SCNC: 142 MMOL/L (ref 136–145)
TRIGL SERPL-MCNC: 176 MG/DL (ref ?–150)
VLDLC SERPL CALC-MCNC: 35.2 MG/DL

## 2020-02-19 PROCEDURE — 80053 COMPREHEN METABOLIC PANEL: CPT

## 2020-02-19 PROCEDURE — 80061 LIPID PANEL: CPT

## 2020-02-19 NOTE — PROGRESS NOTES
Tip Davis is a 36 y.o. male  presents for follow up. No new complaints. States cough is much better. Allergies   Allergen Reactions    Shellfish Derived Anaphylaxis    Sulfa (Sulfonamide Antibiotics) Hives and Rash    Ceclor [Cefaclor] Unknown (comments)    Erythromycin Unknown (comments)    Other Plant, Animal, Environmental Rash and Itching     Seasonal allergies to 10 types of trees    Penicillins Itching     Outpatient Medications Marked as Taking for the 2/19/20 encounter (Office Visit) with Reggie Moya MD   Medication Sig Dispense Refill    ciprofloxacin HCl (CIPRO) 500 mg tablet Take 1 Tab by mouth two (2) times a day for 10 days. 20 Tab 0    methocarbamol (ROBAXIN) 500 mg tablet Take 1 Tab by mouth four (4) times daily. 40 Tab 1    meloxicam (MOBIC) 15 mg tablet Take 1 Tab by mouth daily. 30 Tab 2    escitalopram oxalate (LEXAPRO) 10 mg tablet TAKE ONE TABLET BY MOUTH DAILY 30 Tab 0     Patient Active Problem List   Diagnosis Code    Pollen allergies Z91.09    Scoliosis M41.9    Depression F32.9    Other hyperlipidemia E78.49    Abnormal thyroid blood test R79.89    Non-seasonal allergic rhinitis due to pollen J30.1     Past Medical History:   Diagnosis Date    Chronic ear infection     Knee pain     right knee pain     Scoliosis     curvature of spine     Social History     Socioeconomic History    Marital status: LEGALLY      Spouse name: Not on file    Number of children: Not on file    Years of education: Not on file    Highest education level: Not on file   Tobacco Use    Smoking status: Never Smoker    Smokeless tobacco: Never Used   Substance and Sexual Activity    Alcohol use:  Yes     Alcohol/week: 0.0 - 0.8 standard drinks    Drug use: No     Family History   Problem Relation Age of Onset    Hypertension Mother     Seizures Mother     Seizures Brother     Hypertension Maternal Grandmother     Cancer Maternal Grandfather         Review of Systems   Constitutional: Negative for chills, fever, malaise/fatigue and weight loss. Eyes: Negative for blurred vision. Respiratory: Negative for shortness of breath and wheezing. Cardiovascular: Negative for chest pain. Gastrointestinal: Negative for nausea and vomiting. Musculoskeletal: Negative for myalgias. Skin: Negative for rash. Neurological: Negative for weakness. Vitals:    02/19/20 1033   Resp: 13   Weight: 236 lb (107 kg)   Height: 5' 10\" (1.778 m)   PainSc:   0 - No pain       Physical Exam  Vitals signs and nursing note reviewed. Neck:      Musculoskeletal: Normal range of motion and neck supple. Thyroid: No thyromegaly. Cardiovascular:      Rate and Rhythm: Normal rate and regular rhythm. Heart sounds: Normal heart sounds. Pulmonary:      Effort: Pulmonary effort is normal.      Breath sounds: Normal breath sounds. Musculoskeletal: Normal range of motion. Skin:     General: Skin is warm and dry. Neurological:      Mental Status: He is alert and oriented to person, place, and time. Psychiatric:         Mood and Affect: Mood normal.         Behavior: Behavior normal.         Thought Content: Thought content normal.         Judgment: Judgment normal.         Assessment/Plan      ICD-10-CM ICD-9-CM    1. Other hyperlipidemia E78.49 272.4 LIPID PANEL      METABOLIC PANEL, COMPREHENSIVE   2. Other depression F32.89 311      I have discussed the diagnosis with the patient and the intended plan of care as seen in the above orders. The patient has received an after-visit summary and questions were answered concerning future plans. I have discussed medication, side effects, and warnings with the patient in detail. The patient verbalized understanding and is in agreement with the plan of care. The patient will contact the office with any additional concerns.     lab results and schedule of future lab studies reviewed with patient    Mary Kate Garcia MD

## 2020-02-19 NOTE — PATIENT INSTRUCTIONS

## 2020-03-04 ENCOUNTER — TELEPHONE (OUTPATIENT)
Dept: FAMILY MEDICINE CLINIC | Age: 35
End: 2020-03-04

## 2020-07-01 ENCOUNTER — TELEPHONE (OUTPATIENT)
Dept: FAMILY MEDICINE CLINIC | Age: 35
End: 2020-07-01

## 2020-07-01 DIAGNOSIS — H91.90 DECREASED HEARING, UNSPECIFIED LATERALITY: Primary | ICD-10-CM

## 2021-05-25 ENCOUNTER — VIRTUAL VISIT (OUTPATIENT)
Dept: FAMILY MEDICINE CLINIC | Age: 36
End: 2021-05-25
Payer: MEDICAID

## 2021-05-25 DIAGNOSIS — F32.89 OTHER DEPRESSION: ICD-10-CM

## 2021-05-25 DIAGNOSIS — F41.9 ANXIETY: Primary | ICD-10-CM

## 2021-05-25 PROCEDURE — 99214 OFFICE O/P EST MOD 30 MIN: CPT | Performed by: FAMILY MEDICINE

## 2021-05-25 RX ORDER — ESCITALOPRAM OXALATE 10 MG/1
TABLET ORAL
Qty: 30 TABLET | Refills: 3 | Status: SHIPPED | OUTPATIENT
Start: 2021-05-25

## 2021-05-25 RX ORDER — LORAZEPAM 0.5 MG/1
0.5 TABLET ORAL
Qty: 20 TABLET | Refills: 0 | Status: SHIPPED | OUTPATIENT
Start: 2021-05-25

## 2021-05-25 NOTE — PROGRESS NOTES
Chief Complaint   Patient presents with    Anxiety    Arthritis     Pt f/u chronic conditions. Requesting refills. Needs new script on lorazepam. States he uses it very very rarely, but when his anxiety gets really bad it helps bring him down.

## 2021-09-10 ENCOUNTER — TELEPHONE (OUTPATIENT)
Dept: FAMILY MEDICINE CLINIC | Age: 36
End: 2021-09-10

## 2021-09-10 ENCOUNTER — VIRTUAL VISIT (OUTPATIENT)
Dept: FAMILY MEDICINE CLINIC | Age: 36
End: 2021-09-10
Payer: MEDICAID

## 2021-09-10 DIAGNOSIS — J02.9 SORE THROAT: Primary | ICD-10-CM

## 2021-09-10 DIAGNOSIS — R51.9 ACUTE NONINTRACTABLE HEADACHE, UNSPECIFIED HEADACHE TYPE: ICD-10-CM

## 2021-09-10 PROCEDURE — 99213 OFFICE O/P EST LOW 20 MIN: CPT | Performed by: FAMILY MEDICINE

## 2021-09-10 NOTE — PROGRESS NOTES
Patient c/o sinus pressure, sore throat and head ache. He has not noticed any fever. He also c/o cough and sinus drainage. 1. Have you been to the ER, urgent care clinic since your last visit? Hospitalized since your last visit? No    2. Have you seen or consulted any other health care providers outside of the [facility] since your last visit? Include any pap smears or colon screening. No      Medication reconciliation has been completed with patient. Care team discussed/updated as well as pharmacy.     Health Maintenance Due   Topic Date Due    Hepatitis C Screening  Never done    COVID-19 Vaccine (1) Never done    Flu Vaccine (1) Never done

## 2021-09-10 NOTE — PROGRESS NOTES
Assessment/Plan:   1. Sore throat  2. Acute nonintractable headache, unspecified headache type    Acute infectious process. Vaccination is reassuring but cannot exclude COVID without testing. Strep, mono also possible, among generic viral etiologies. Will notify his boss he will not be working tomorrow and proceed to  for testing/management        Subjective:   Daniel Almazan presents for evaluation of Sinus Pain and Sore Throat     sinus pressure, sore throat, head ache, cough and sinus drainage. Denies fever, n/v, shortness of breath or wheezing  This started several days ago, and is gradually worsening since that time. Nursing student friend told him his lymph nodes are enlarged    Started working at [employer] as [employer] recently  +vaccinated with [employer]    No data recorded  Physical Exam  General: alert, cooperative, appears stated age, moderately obese, uncomfortable appearing  Lung exam: no resp distress    Theodora Medeiros, was evaluated through a synchronous (real-time) audio-video encounter. The patient (or guardian if applicable) is aware that this is a billable service. Verbal consent to proceed has been obtained within the past 12 months. The visit was conducted pursuant to the emergency declaration under the [facility], [address] [place] [address] authority and the [employer] and [employer] General Act. Patient identification was verified, and a caregiver was present when appropriate. The patient was located in a state where the provider was credentialed to provide care. An electronic signature was used to authenticate this note.   -- Kei Maxwell MD

## 2021-09-10 NOTE — TELEPHONE ENCOUNTER
Patient would like someone to call him back, regarding his sore throat. I informed the patient, that Dr Boom Ferguson was not in the office today and our other provider is booked, but if someone happens to cx, I will call him back to schedule him today.

## 2021-09-16 ENCOUNTER — TELEPHONE (OUTPATIENT)
Dept: FAMILY MEDICINE CLINIC | Age: 36
End: 2021-09-16

## 2021-09-16 NOTE — TELEPHONE ENCOUNTER
Mr. Lois Robles called stating he still has a lingering sinus infection. He seen Dr. Gladis Harris for a virtual visit last week, he also went to Urgent Care per Dr. Blaine Bradford request.   He said he still feels bad, and has given whatever this is to his mom, brother, & cousin. He also has some ankle swelling, he would like checked out by Dr. Georgia Zaman. I explained it would have to be a virtual visit still because we cannot bring that inside of the office. Patient said he understood. Offered to work in him (after consulting with the provider). He did not schedule and said he may go back to Urgent Care. He is currently on a z-claire. He is requesting a note to cover him for missing work. He states he works at the [employer]. Informed him I would send a msg to the nurse for a letter. [phone].

## 2021-09-21 NOTE — TELEPHONE ENCOUNTER
Late entry      Spoke with pt's mom. She said that pt needs a note for work. I told her my best advise would be for him to go to urgent care for both the sick sxs and his ankle issue. She can either give me a fax number to send the letter or come and pick it up.

## 2021-09-22 ENCOUNTER — TELEPHONE (OUTPATIENT)
Dept: FAMILY MEDICINE CLINIC | Age: 36
End: 2021-09-22

## 2021-09-22 DIAGNOSIS — J40 BRONCHITIS: Primary | ICD-10-CM

## 2021-09-22 NOTE — TELEPHONE ENCOUNTER
Pts mother, Jenn Balbuena is calling because she states her son takes his last pill tomorrow. He still sounds the same and he doesn't seem to be getting better. She wants to know if Dr. Robert Castro can order a chest xray.  Please return call to [phone]

## 2021-09-23 NOTE — TELEPHONE ENCOUNTER
Called patient to inform  him that the xray was ordered. He informed me that he is feeling much better and doesn't need that xray. I told him the order would be in there if he needed it.

## 2021-10-07 DIAGNOSIS — J40 BRONCHITIS: ICD-10-CM

## 2022-01-11 ENCOUNTER — VIRTUAL VISIT (OUTPATIENT)
Dept: FAMILY MEDICINE CLINIC | Age: 37
End: 2022-01-11
Payer: MEDICAID

## 2022-01-11 DIAGNOSIS — Z20.822 SUSPECTED COVID-19 VIRUS INFECTION: Primary | ICD-10-CM

## 2022-01-11 PROCEDURE — 99213 OFFICE O/P EST LOW 20 MIN: CPT | Performed by: FAMILY MEDICINE

## 2022-01-11 RX ORDER — PREDNISONE 10 MG/1
TABLET ORAL
Qty: 21 TABLET | Refills: 0 | Status: SHIPPED | OUTPATIENT
Start: 2022-01-11

## 2022-01-11 RX ORDER — DOXYCYCLINE 100 MG/1
100 CAPSULE ORAL 2 TIMES DAILY
Qty: 20 CAPSULE | Refills: 0 | Status: SHIPPED | OUTPATIENT
Start: 2022-01-11 | End: 2022-01-21

## 2022-01-11 NOTE — PROGRESS NOTES
Valerio Cleveland is a 29 y.o. male who was seen by synchronous (real-time) audio-video technology on 1/11/2022 for Sinus Infection, Fatigue, Headache, and Generalized Body Aches        Assessment & Plan:   Diagnoses and all orders for this visit:    1. Suspected COVID-19 virus infection  -     doxycycline (VIBRAMYCIN) 100 mg capsule; Take 1 Capsule by mouth two (2) times a day for 10 days. -     predniSONE (STERAPRED DS) 10 mg dose pack; See administration instruction per 10mg dose pack        He will get covid test today. He will go to ER if sxs increase. 712  Subjective:       Prior to Admission medications    Medication Sig Start Date End Date Taking? Authorizing Provider   escitalopram oxalate (LEXAPRO) 10 mg tablet TAKE ONE TABLET BY MOUTH DAILY 5/25/21  Yes Jeanette Medina MD   LORazepam (ATIVAN) 0.5 mg tablet Take 1 Tablet by mouth two (2) times daily as needed for Anxiety. Max Daily Amount: 1 mg. 5/25/21  Yes Jeanette Medina MD   methocarbamol (ROBAXIN) 500 mg tablet Take 1 Tab by mouth four (4) times daily. 2/11/20  Yes Jeanette Medina MD     Patient Active Problem List   Diagnosis Code    Pollen allergies Z91.09    Scoliosis M41.9    Depression F32. A    Other hyperlipidemia E78.49    Abnormal thyroid blood test R79.89    Non-seasonal allergic rhinitis due to pollen J30.1     Past Medical History:   Diagnosis Date    Chronic ear infection     Knee pain     right knee pain     Scoliosis     curvature of spine       Review of Systems   Constitutional: Negative for chills, fever, malaise/fatigue and weight loss. HENT: Positive for congestion. Eyes: Negative for blurred vision. Respiratory: Positive for cough. Negative for shortness of breath and wheezing. Cardiovascular: Negative for chest pain. Gastrointestinal: Negative for nausea and vomiting. Musculoskeletal: Negative for myalgias. Skin: Negative for rash. Neurological: Negative for weakness.    Psychiatric/Behavioral: Negative. Objective:   No flowsheet data found. General: alert, cooperative, no distress   Mental  status: normal mood, behavior, speech, dress, motor activity, and thought processes, able to follow commands   HENT: NCAT   Neck: no visualized mass   Resp: no respiratory distress   Neuro: no gross deficits   Skin: no discoloration or lesions of concern on visible areas   Psychiatric: normal affect, consistent with stated mood, no evidence of hallucinations     Additional exam findings: We discussed the expected course, resolution and complications of the diagnosis(es) in detail. Medication risks, benefits, costs, interactions, and alternatives were discussed as indicated. I advised him to contact the office if his condition worsens, changes or fails to improve as anticipated. He expressed understanding with the diagnosis(es) and plan. Melina Amaral, was evaluated through a synchronous (real-time) audio-video encounter. The patient (or guardian if applicable) is aware that this is a billable service. Verbal consent to proceed has been obtained within the past 12 months. The visit was conducted pursuant to the emergency declaration under the [address] [place] [address] authority and the [facility] [facility] and [employer] General Act. Patient identification was verified, and a caregiver was present when appropriate. The patient was located in a state where the provider was credentialed to provide care.     Farida Lara MD

## 2022-01-11 NOTE — LETTER
NOTIFICATION RETURN TO WORK       O1/11/2022 3:09 PM    Mr. Vu Arroyo  [facility] [address]      To Whom It May Concern:    Vu Arroyo is currently under the care of [facility]. He will return to work/school on: 1/17/22. If there are questions or concerns please have the patient contact our office.         Sincerely,      Hayder Lora MD

## 2022-01-11 NOTE — PROGRESS NOTES
Chief Complaint   Patient presents with    Sinus Infection    Fatigue    Headache    Generalized Body Aches       sxs x 1 day. States that his sxs gradually came on yesterday morning and got worse as the day went on. Pt works as a  at [employer] work note faxed to [employer]. Will call back with number. If he can't get the number, he will have his brother Calixto Pelletier come to pick it up.

## 2022-02-11 ENCOUNTER — NURSE TRIAGE (OUTPATIENT)
Dept: OTHER | Facility: CLINIC | Age: 37
End: 2022-02-11

## 2022-02-11 NOTE — TELEPHONE ENCOUNTER
Received call from [facility] with [employer]. Subjective: Caller states \"About a week ago, I was on the click when this happened. My ankle rolled to a 90 degree ankle. I've got a Workman's comp case going on right now. I worked the rest of that week on it, pushing through the pain. Over the last couple days it's gotten worse. To the point, Tuesday I had to call out, it was so sore, I couldn't put any pressure. I had to crawl around the house on my knees to do anything. About 3 days ago my left knee started getting really inflamammed. There is a bulge on the outside. There is a sac of fluid there. Now it's to the point if I need to go to the bathroom, I have to slide down off my bed and crab walk to the bathroom. I haven't been able to get down to the kitchen. My mother has been home, but she's been busy with other things. \"     Current Symptoms: ankle injury/pain- worsening, can't walk- crawling- now left knee swollen and painful, fatigue d/t not eating b/c can't move around    Prone to ankles rolling, but not to this severity. Has had dislocated ankles. Hx of torn ligament in right foot    Onset: 1 week ago; worsening    Associated Symptoms: reduced activity    Pain Severity: 10+/10; crushing in knee/ ankle stabbing/burning; with movement    Temperature: Denies     What has been tried: Ibuprofen 600mg-helps for about 14 hours, resting, ice-not help, heat makes ankle feel better    LMP: NA Pregnant: NA    Recommended disposition: Go to ED Now or Office with PCP Approval. Patient agreeable. Care advice provided, patient verbalizes understanding; denies any other questions or concerns; instructed to call back for any new or worsening symptoms. Writer provided warm transfer to Michelle Garrison at [facility] for 2nd Level Triage. Attention Provider: Thank you for allowing me to participate in the care of your patient.   The patient was connected to triage in response to information provided to the ECC. Please do not respond through this encounter as the response is not directed to a shared pool.     Reason for Disposition   Can't stand (bear weight) or walk (e.g., 4 steps)    Protocols used: ANKLE AND FOOT INJURY-ADULT-[place]

## 2022-06-29 ENCOUNTER — OFFICE VISIT (OUTPATIENT)
Dept: ORTHOPEDIC SURGERY | Age: 37
End: 2022-06-29
Payer: MEDICAID

## 2022-06-29 VITALS — BODY MASS INDEX: 35.07 KG/M2 | TEMPERATURE: 98.7 F | WEIGHT: 245 LBS | HEIGHT: 70 IN

## 2022-06-29 DIAGNOSIS — G89.29 CHRONIC PAIN OF LEFT KNEE: ICD-10-CM

## 2022-06-29 DIAGNOSIS — M25.561 CHRONIC PAIN OF RIGHT KNEE: Primary | ICD-10-CM

## 2022-06-29 DIAGNOSIS — G89.29 CHRONIC PAIN OF RIGHT KNEE: Primary | ICD-10-CM

## 2022-06-29 DIAGNOSIS — M25.562 CHRONIC PAIN OF LEFT KNEE: ICD-10-CM

## 2022-06-29 DIAGNOSIS — M17.0 PRIMARY OSTEOARTHRITIS OF BOTH KNEES: ICD-10-CM

## 2022-06-29 PROCEDURE — 99213 OFFICE O/P EST LOW 20 MIN: CPT | Performed by: ORTHOPAEDIC SURGERY

## 2022-06-29 RX ORDER — CELECOXIB 200 MG/1
200 CAPSULE ORAL 2 TIMES DAILY
Qty: 60 CAPSULE | Refills: 2 | Status: CANCELLED | OUTPATIENT
Start: 2022-06-29 | End: 2022-09-27

## 2022-06-29 RX ORDER — MELOXICAM 15 MG/1
15 TABLET ORAL DAILY
Qty: 30 TABLET | Refills: 1 | Status: SHIPPED | OUTPATIENT
Start: 2022-06-29

## 2022-06-29 NOTE — PROGRESS NOTES
Tucker Balderrama  1985   Chief Complaint   Patient presents with    Arm Pain     lt     Elbow Pain     lt     Knee Pain     bilateral         HISTORY OF PRESENT ILLNESS  Tucker Balderrama is a 29 y.o. male who presents today for evaluation of bilateral knee pain . He wanted to see about having his knee aspirated because he has had problems in the past had been on the care of Dr. Elsy Barcenas. He is also complaining of left arm pain that got better and then his elbow is also hurting. .     Current Outpatient Medications   Medication Sig Dispense Refill    meloxicam (MOBIC) 15 mg tablet Take 1 Tablet by mouth daily. 30 Tablet 1    predniSONE (STERAPRED DS) 10 mg dose pack See administration instruction per 10mg dose pack 21 Tablet 0    escitalopram oxalate (LEXAPRO) 10 mg tablet TAKE ONE TABLET BY MOUTH DAILY 30 Tablet 3    LORazepam (ATIVAN) 0.5 mg tablet Take 1 Tablet by mouth two (2) times daily as needed for Anxiety. Max Daily Amount: 1 mg. 20 Tablet 0    methocarbamol (ROBAXIN) 500 mg tablet Take 1 Tab by mouth four (4) times daily. 40 Tab 1      Allergies   Allergen Reactions    Shellfish Derived Anaphylaxis    Sulfa (Sulfonamide Antibiotics) Hives and Rash    Ceclor [Cefaclor] Unknown (comments)    Erythromycin Unknown (comments)    Other Plant, Animal, Environmental Rash and Itching     Seasonal allergies to 10 types of trees    Penicillins Itching      Social History     Socioeconomic History    Marital status: LEGALLY      Spouse name: Not on file    Number of children: Not on file    Years of education: Not on file    Highest education level: Not on file   Occupational History    Not on file   Tobacco Use    Smoking status: Never Smoker    Smokeless tobacco: Never Used   Substance and Sexual Activity    Alcohol use:  Yes     Alcohol/week: 0.0 - 0.8 standard drinks    Drug use: No    Sexual activity: Not on file   Other Topics Concern    Not on file   Social History Narrative    Not on file     Social Determinants of Health     Financial Resource Strain:     Difficulty of Paying Living Expenses: Not on file   Food Insecurity:     Worried About Running Out of Food in the Last Year: Not on file    Priti of Food in the Last Year: Not on file   Transportation Needs:     Lack of Transportation (Medical): Not on file    Lack of Transportation (Non-Medical): Not on file   Physical Activity:     Days of Exercise per Week: Not on file    Minutes of Exercise per Session: Not on file   Stress:     Feeling of Stress : Not on file   Social Connections:     Frequency of Communication with Friends and Family: Not on file    Frequency of Social Gatherings with Friends and Family: Not on file    Attends Voodoo Services: Not on file    Active Member of [address] [place] [address] [employer] or Organizations: Not on file    Attends Club or Organization Meetings: Not on file    Marital Status: Not on file   Intimate Partner Violence:     Fear of Current or Ex-Partner: Not on file    Emotionally Abused: Not on file    Physically Abused: Not on file    Sexually Abused: Not on file   Housing Stability:     Unable to Pay for Housing in the Last Year: Not on file    Number of [place] in the Last Year: Not on file    Unstable Housing in the Last Year: Not on file        [address]   Patient denies: Weight loss, Fever/Chills, HA, Visual changes, Fatigue, Chest pain, SOB, Abdominal pain, N/V/D/C, Blood in stool or urine, Edema. Pertinent positive as above in HPI. All others were negative    PHYSICAL EXAM:   Visit Vitals  Temp 98.7 °F (37.1 °C) (Temporal)   Ht 5' 10\" (1.778 m)   Wt 245 lb (111.1 kg)   BMI 35.15 kg/m²     The patient is a well-developed, well-nourished male   in no acute distress. The patient is alert and oriented times three. The patient is alert and oriented times three.  Mood and affect are normal.  LYMPHATIC: lymph nodes are not enlarged and are within normal limits  SKIN: normal in color and non tender to palpation. There are no bruises or abrasions noted. NEUROLOGICAL: Motor sensory exam is within normal limits. Reflexes are equal bilaterally. There is normal sensation to pinprick and light touch  MUSCULOSKELETAL:  Inspection there is no effusion present on either knee no effusion to palpation and has full range of motion of both knees with no instability. Left elbow no swelling he does have discomfort when flexing beyond 130 degrees    PROCEDURE: None    RADIOGRAPHS/X-RAYS: Standing AP of both knees lateral of both knees obtained today 6/29/2022 show no acute abnormalities with minimal degenerative changes in the patellofemoral joint    IMPRESSION:      ICD-10-CM ICD-9-CM    1. Chronic pain of right knee  M25.561 719.46 AMB POC XRAY, KNEE; 1/2 VIEWS    G89.29 338.29    2. Chronic pain of left knee  M25.562 719.46 AMB POC XRAY, KNEE; 1/2 VIEWS    G89.29 338.29    3. Primary osteoarthritis of both knees  M17.0 715.16         PLAN: This time I would have the same recommendations that Dr. Millicent Ramirez had in the past in terms of physical therapy as well as an anti-inflammatory medicine such as Mobic.   I would suggest for him to follow-up regarding his knees with Dr. Millicent Ramirez      Scribed by Yung Ackerman [address]) as dictated by MD Faisal Roldan M.D.   [facility]